# Patient Record
Sex: FEMALE | Race: BLACK OR AFRICAN AMERICAN | NOT HISPANIC OR LATINO | Employment: FULL TIME | URBAN - METROPOLITAN AREA
[De-identification: names, ages, dates, MRNs, and addresses within clinical notes are randomized per-mention and may not be internally consistent; named-entity substitution may affect disease eponyms.]

---

## 2017-07-11 ENCOUNTER — TELEPHONE (OUTPATIENT)
Dept: FAMILY MEDICINE | Facility: CLINIC | Age: 32
End: 2017-07-11

## 2017-07-13 ENCOUNTER — TELEPHONE (OUTPATIENT)
Dept: FAMILY MEDICINE | Facility: CLINIC | Age: 32
End: 2017-07-13

## 2017-07-13 DIAGNOSIS — R73.03 PREDIABETES: ICD-10-CM

## 2017-07-13 NOTE — TELEPHONE ENCOUNTER
I called and spoke with patient and made her an appointment on Monday September 25th she is asking if she needs to have lab work done before her appointment.Thanks

## 2017-07-16 ENCOUNTER — PATIENT MESSAGE (OUTPATIENT)
Dept: FAMILY MEDICINE | Facility: CLINIC | Age: 32
End: 2017-07-16

## 2017-09-04 ENCOUNTER — HOSPITAL ENCOUNTER (EMERGENCY)
Facility: OTHER | Age: 32
Discharge: HOME | End: 2017-09-04
Attending: EMERGENCY MEDICINE
Payer: COMMERCIAL

## 2017-09-04 VITALS
HEIGHT: 63 IN | OXYGEN SATURATION: 99 % | HEART RATE: 88 BPM | BODY MASS INDEX: 38.98 KG/M2 | SYSTOLIC BLOOD PRESSURE: 138 MMHG | DIASTOLIC BLOOD PRESSURE: 70 MMHG | WEIGHT: 220 LBS | RESPIRATION RATE: 18 BRPM | TEMPERATURE: 98 F

## 2017-09-04 DIAGNOSIS — R19.7 DIARRHEA, UNSPECIFIED TYPE: ICD-10-CM

## 2017-09-04 DIAGNOSIS — R10.10 PAIN OF UPPER ABDOMEN: Primary | ICD-10-CM

## 2017-09-04 DIAGNOSIS — K80.20 CALCULUS OF GALLBLADDER WITHOUT CHOLECYSTITIS WITHOUT OBSTRUCTION: ICD-10-CM

## 2017-09-04 DIAGNOSIS — R11.0 NAUSEA: ICD-10-CM

## 2017-09-04 LAB
ALBUMIN SERPL-MCNC: 3.3 G/DL (ref 3.3–5.5)
ALP SERPL-CCNC: 72 U/L (ref 42–141)
B-HCG UR QL: NEGATIVE
BILIRUB SERPL-MCNC: 0.6 MG/DL (ref 0.2–1.6)
BILIRUBIN, POC UA: NEGATIVE
BLOOD, POC UA: ABNORMAL
BUN SERPL-MCNC: 12 MG/DL (ref 7–22)
CALCIUM SERPL-MCNC: 9.1 MG/DL (ref 8–10.3)
CHLORIDE SERPL-SCNC: 98 MMOL/L (ref 98–108)
CLARITY, POC UA: ABNORMAL
COLOR, POC UA: YELLOW
CREAT SERPL-MCNC: 0.8 MG/DL (ref 0.6–1.2)
CTP QC/QA: YES
GLUCOSE SERPL-MCNC: 151 MG/DL (ref 73–118)
GLUCOSE, POC UA: NEGATIVE
KETONES, POC UA: NEGATIVE
LEUKOCYTE EST, POC UA: NEGATIVE
NITRITE, POC UA: NEGATIVE
PH UR STRIP: 7 [PH]
POC ALT (SGPT): 121 U/L (ref 10–47)
POC AST (SGOT): 123 U/L (ref 11–38)
POC TCO2: 26 MMOL/L (ref 18–33)
POTASSIUM BLD-SCNC: 3.4 MMOL/L (ref 3.6–5.1)
PROTEIN, POC UA: ABNORMAL
PROTEIN, POC: 7.8 G/DL (ref 6.4–8.1)
SODIUM BLD-SCNC: 134 MMOL/L (ref 128–145)
SPECIFIC GRAVITY, POC UA: 1.02
UROBILINOGEN, POC UA: 0.2 E.U./DL

## 2017-09-04 PROCEDURE — 25000003 PHARM REV CODE 250: Performed by: EMERGENCY MEDICINE

## 2017-09-04 PROCEDURE — 99284 EMERGENCY DEPT VISIT MOD MDM: CPT | Mod: 25

## 2017-09-04 PROCEDURE — 80053 COMPREHEN METABOLIC PANEL: CPT

## 2017-09-04 PROCEDURE — 96375 TX/PRO/DX INJ NEW DRUG ADDON: CPT

## 2017-09-04 PROCEDURE — 63600175 PHARM REV CODE 636 W HCPCS: Performed by: EMERGENCY MEDICINE

## 2017-09-04 PROCEDURE — 81025 URINE PREGNANCY TEST: CPT | Performed by: EMERGENCY MEDICINE

## 2017-09-04 PROCEDURE — 81003 URINALYSIS AUTO W/O SCOPE: CPT

## 2017-09-04 PROCEDURE — 96374 THER/PROPH/DIAG INJ IV PUSH: CPT

## 2017-09-04 PROCEDURE — 85025 COMPLETE CBC W/AUTO DIFF WBC: CPT

## 2017-09-04 PROCEDURE — 96361 HYDRATE IV INFUSION ADD-ON: CPT

## 2017-09-04 RX ORDER — KETOROLAC TROMETHAMINE 30 MG/ML
15 INJECTION, SOLUTION INTRAMUSCULAR; INTRAVENOUS
Status: COMPLETED | OUTPATIENT
Start: 2017-09-04 | End: 2017-09-04

## 2017-09-04 RX ORDER — SODIUM CHLORIDE 9 MG/ML
1000 INJECTION, SOLUTION INTRAVENOUS
Status: COMPLETED | OUTPATIENT
Start: 2017-09-04 | End: 2017-09-04

## 2017-09-04 RX ORDER — ONDANSETRON 4 MG/1
4 TABLET, FILM COATED ORAL EVERY 6 HOURS PRN
Qty: 15 TABLET | Refills: 0 | Status: SHIPPED | OUTPATIENT
Start: 2017-09-04 | End: 2017-09-25

## 2017-09-04 RX ORDER — TRAMADOL HYDROCHLORIDE 50 MG/1
50 TABLET ORAL EVERY 8 HOURS PRN
Qty: 12 TABLET | Refills: 0 | Status: SHIPPED | OUTPATIENT
Start: 2017-09-04 | End: 2017-09-14

## 2017-09-04 RX ORDER — ONDANSETRON 2 MG/ML
4 INJECTION INTRAMUSCULAR; INTRAVENOUS
Status: COMPLETED | OUTPATIENT
Start: 2017-09-04 | End: 2017-09-04

## 2017-09-04 RX ADMIN — KETOROLAC TROMETHAMINE 15 MG: 30 INJECTION, SOLUTION INTRAMUSCULAR at 12:09

## 2017-09-04 RX ADMIN — SODIUM CHLORIDE 1000 ML: 0.9 INJECTION, SOLUTION INTRAVENOUS at 07:09

## 2017-09-04 RX ADMIN — ONDANSETRON 4 MG: 2 INJECTION INTRAMUSCULAR; INTRAVENOUS at 07:09

## 2017-09-04 NOTE — ED PROVIDER NOTES
Encounter Date: 2017       History     Chief Complaint   Patient presents with    Abdominal Pain     upper/mis abd pain constant since midnight, + nausea, no vomitng, soft stool approx 1 hour ago; denies any  complaints     Ms Gandhi reports onset of nausea, epigastric pain and diarrhea around midnight. Reports diarrhea, non bloody, watery, 'several times'. No vomiting. States abd pain has migrated to include mid abdomen and diffuse. No hx of same, no recent sick contacts, no fevers. No cp or sob. No meds tried at home. sxs are constant.       The history is provided by the patient.     Review of patient's allergies indicates:  No Known Allergies  Past Medical History:   Diagnosis Date    Alpha thalassemia trait     Anemia     Menorrhagia     Obesity     Prediabetes      Past Surgical History:   Procedure Laterality Date     SECTION, LOW TRANSVERSE      x1    tonsillectomy       Family History   Problem Relation Age of Onset    Hypertension Mother     Diabetes Mother     Heart disease Father      enlarged    Diabetes Maternal Aunt     Diabetes Maternal Uncle     Colon cancer Maternal Uncle     Breast cancer Neg Hx      Social History   Substance Use Topics    Smoking status: Never Smoker    Smokeless tobacco: Never Used    Alcohol use Not on file     Review of Systems   Respiratory: Negative.    Cardiovascular: Negative.    Gastrointestinal: Positive for abdominal pain, diarrhea, nausea and vomiting.   All other systems reviewed and are negative.      Physical Exam     Initial Vitals [17 0656]   BP Pulse Resp Temp SpO2   135/83 88 18 98.7 °F (37.1 °C) 100 %      MAP       100.33         Physical Exam    Nursing note and vitals reviewed.  Constitutional: She appears well-developed and well-nourished. She is not diaphoretic.   Sitting up. Calm, still. Appears to not feel well.    HENT:   Head: Normocephalic and atraumatic.   Neck: Normal range of motion. Neck supple.    Cardiovascular: Normal rate, regular rhythm and normal heart sounds.   No murmur heard.  Pulmonary/Chest: Breath sounds normal. No respiratory distress. She has no wheezes. She has no rales.   Abdominal: Soft. She exhibits no distension. There is tenderness. There is no rebound and no guarding.   Diffuse upper and mid abd ttp.    Musculoskeletal: Normal range of motion. She exhibits no edema or tenderness.   Neurological: She is alert and oriented to person, place, and time.   Skin: Skin is warm and dry. Capillary refill takes less than 2 seconds. No rash noted. No erythema.   Psychiatric: She has a normal mood and affect. Her behavior is normal. Thought content normal.         ED Course   Procedures  Labs Reviewed   POCT URINALYSIS W/O SCOPE - Abnormal; Notable for the following:        Result Value    Glucose, UA Negative (*)     Bilirubin, UA Negative (*)     Ketones, UA Negative (*)     Blood, UA Trace-lysed (*)     Protein, UA Trace (*)     Nitrite, UA Negative (*)     Leukocytes, UA Negative (*)     All other components within normal limits   POCT URINALYSIS W/O SCOPE   POCT URINE PREGNANCY   POCT CBC   POCT CMP             Medical Decision Making:   Clinical Tests:   Lab Tests: Ordered and Reviewed  Radiological Study: Ordered and Reviewed  ED Management:  Ms Gandhi feels better and wants to go home. We discussed US and lab results extensively. We discussed options of admission vs outpt f/u and she wants to f/u as outpt for further care. We discussed worrisome signs that should prompt need to return to the er should they occur. There is no indication for further emergent intervention or evaluation at this time.           Imaging Results          US Abdomen Limited (Final result)  Result time 09/04/17 11:06:00    Final result by Amanda Sinclair MD (09/04/17 11:06:00)                 Impression:     Hepatomegaly with fatty infiltration of the liver.    Echogenic material within the gallbladder without  posterior shadowing may reflect sludge versus small stones.  No finding to indicate acute cholecystitis.      Electronically signed by: Amanda Sinclair MD  Date:     09/04/17  Time:    11:05              Narrative:    The visualized pancreas is unremarkable.  The aorta is not aneurysmal.  The gallbladder demonstrates echogenic material along the posterior wall without shadowing.  This may represent sludge versus small stones.  There is no tenderness while scanning over the gallbladder.  No gallbladder wall thickening or pericholecystic fluid is present.  The common duct is 3 mm, not dilated.    The liver is enlarged, 17.8 cm, and demonstrates fatty infiltration.  The right kidney is unremarkable.                                  Admission on 09/04/2017, Discharged on 09/04/2017   Component Date Value Ref Range Status    POC Preg Test, Ur 09/04/2017 Negative  Negative Final     Acceptable 09/04/2017 Yes   Final    Glucose, UA 09/04/2017 Negative*  Final    Bilirubin, UA 09/04/2017 Negative*  Final    Ketones, UA 09/04/2017 Negative*  Final    Spec Grav UA 09/04/2017 1.025   Final    Blood, UA 09/04/2017 Trace-lysed*  Final    PH, UA 09/04/2017 7.0   Final    Protein, UA 09/04/2017 Trace*  Final    Urobilinogen, UA 09/04/2017 0.2  E.U./dL Final    Nitrite, UA 09/04/2017 Negative*  Final    Leukocytes, UA 09/04/2017 Negative*  Final    Color, UA 09/04/2017 Yellow   Final    Clarity, UA 09/04/2017 Slightly Cloudy   Final    Albumin, POC 09/04/2017 3.3* 3.3 - 5.5 g/dL Final    Alkaline Phosphatase, POC 09/04/2017 72  42 - 141 U/L Final    ALT (SGPT), POC 09/04/2017 121* 10 - 47 U/L Final    AST (SGOT), POC 09/04/2017 123* 11 - 38 U/L Final    POC BUN 09/04/2017 12  7 - 22 mg/dL Final    Calcium, POC 09/04/2017 9.1  8.0 - 10.3 mg/dL Final    POC Chloride 09/04/2017 98* 98 - 108 mmol/L Final    POC Creatinine 09/04/2017 0.8  0.6 - 1.2 mg/dL Final    POC Glucose 09/04/2017 151* 73 - 118  mg/dL Final    POC Potassium 09/04/2017 3.4* 3.6 - 5.1 mmol/L Final    POC Sodium 09/04/2017 134  128 - 145 mmol/L Final    Bilirubin 09/04/2017 0.6  0.2 - 1.6 mg/dL Final    POC TCO2 09/04/2017 26  18 - 33 mmol/L Final    Protein 09/04/2017 7.8  6.4 - 8.1 g/dL Final                ED Course      Clinical Impression:   The primary encounter diagnosis was Pain of upper abdomen. Diagnoses of Calculus of gallbladder without cholecystitis without obstruction, Diarrhea, unspecified type, and Nausea were also pertinent to this visit.                           Norberto Worley MD  09/06/17 5072

## 2017-09-04 NOTE — DISCHARGE INSTRUCTIONS
Rest. Drink plenty of fluids. Follow a bland diet. Return for any new or acute problems or concerns.

## 2017-09-16 ENCOUNTER — LAB VISIT (OUTPATIENT)
Dept: LAB | Facility: HOSPITAL | Age: 32
End: 2017-09-16
Attending: INTERNAL MEDICINE
Payer: COMMERCIAL

## 2017-09-16 DIAGNOSIS — R73.03 PREDIABETES: ICD-10-CM

## 2017-09-16 LAB
ALBUMIN SERPL BCP-MCNC: 3.4 G/DL
ALP SERPL-CCNC: 73 U/L
ALT SERPL W/O P-5'-P-CCNC: 130 U/L
ANION GAP SERPL CALC-SCNC: 9 MMOL/L
ANISOCYTOSIS BLD QL SMEAR: SLIGHT
AST SERPL-CCNC: 74 U/L
BASOPHILS # BLD AUTO: 0.01 K/UL
BASOPHILS NFR BLD: 0.3 %
BILIRUB SERPL-MCNC: 0.4 MG/DL
BUN SERPL-MCNC: 12 MG/DL
CALCIUM SERPL-MCNC: 8.9 MG/DL
CHLORIDE SERPL-SCNC: 105 MMOL/L
CHOLEST SERPL-MCNC: 168 MG/DL
CHOLEST/HDLC SERPL: 4.3 {RATIO}
CO2 SERPL-SCNC: 24 MMOL/L
CREAT SERPL-MCNC: 0.9 MG/DL
DIFFERENTIAL METHOD: ABNORMAL
EOSINOPHIL # BLD AUTO: 0.1 K/UL
EOSINOPHIL NFR BLD: 2.3 %
ERYTHROCYTE [DISTWIDTH] IN BLOOD BY AUTOMATED COUNT: 16.4 %
EST. GFR  (AFRICAN AMERICAN): >60 ML/MIN/1.73 M^2
EST. GFR  (NON AFRICAN AMERICAN): >60 ML/MIN/1.73 M^2
ESTIMATED AVG GLUCOSE: 111 MG/DL
GIANT PLATELETS BLD QL SMEAR: PRESENT
GLUCOSE SERPL-MCNC: 87 MG/DL
HBA1C MFR BLD HPLC: 5.5 %
HCT VFR BLD AUTO: 36.1 %
HDLC SERPL-MCNC: 39 MG/DL
HDLC SERPL: 23.2 %
HGB BLD-MCNC: 11.1 G/DL
LDLC SERPL CALC-MCNC: 116.6 MG/DL
LYMPHOCYTES # BLD AUTO: 2.1 K/UL
LYMPHOCYTES NFR BLD: 52.4 %
MCH RBC QN AUTO: 22.4 PG
MCHC RBC AUTO-ENTMCNC: 30.7 G/DL
MCV RBC AUTO: 73 FL
MONOCYTES # BLD AUTO: 0.5 K/UL
MONOCYTES NFR BLD: 13 %
NEUTROPHILS # BLD AUTO: 1.3 K/UL
NEUTROPHILS NFR BLD: 32 %
NONHDLC SERPL-MCNC: 129 MG/DL
OVALOCYTES BLD QL SMEAR: ABNORMAL
PLATELET # BLD AUTO: 213 K/UL
PLATELET BLD QL SMEAR: ABNORMAL
PMV BLD AUTO: ABNORMAL FL
POIKILOCYTOSIS BLD QL SMEAR: SLIGHT
POTASSIUM SERPL-SCNC: 4.2 MMOL/L
PROT SERPL-MCNC: 7.9 G/DL
RBC # BLD AUTO: 4.96 M/UL
SODIUM SERPL-SCNC: 138 MMOL/L
TRIGL SERPL-MCNC: 62 MG/DL
WBC # BLD AUTO: 3.93 K/UL

## 2017-09-16 PROCEDURE — 36415 COLL VENOUS BLD VENIPUNCTURE: CPT | Mod: PO

## 2017-09-16 PROCEDURE — 85025 COMPLETE CBC W/AUTO DIFF WBC: CPT

## 2017-09-16 PROCEDURE — 80061 LIPID PANEL: CPT

## 2017-09-16 PROCEDURE — 80053 COMPREHEN METABOLIC PANEL: CPT

## 2017-09-16 PROCEDURE — 83036 HEMOGLOBIN GLYCOSYLATED A1C: CPT

## 2017-09-25 ENCOUNTER — OFFICE VISIT (OUTPATIENT)
Dept: FAMILY MEDICINE | Facility: CLINIC | Age: 32
End: 2017-09-25
Payer: COMMERCIAL

## 2017-09-25 VITALS
WEIGHT: 220.56 LBS | HEIGHT: 63 IN | DIASTOLIC BLOOD PRESSURE: 80 MMHG | OXYGEN SATURATION: 98 % | TEMPERATURE: 98 F | HEART RATE: 70 BPM | BODY MASS INDEX: 39.08 KG/M2 | SYSTOLIC BLOOD PRESSURE: 104 MMHG

## 2017-09-25 DIAGNOSIS — K76.0 FATTY LIVER: ICD-10-CM

## 2017-09-25 DIAGNOSIS — Z23 FLU VACCINE NEED: ICD-10-CM

## 2017-09-25 DIAGNOSIS — Z00.00 ROUTINE MEDICAL EXAM: Primary | ICD-10-CM

## 2017-09-25 DIAGNOSIS — R73.03 PREDIABETES: ICD-10-CM

## 2017-09-25 DIAGNOSIS — Z12.4 SCREENING FOR CERVICAL CANCER: ICD-10-CM

## 2017-09-25 DIAGNOSIS — Z23 NEED FOR STREPTOCOCCUS PNEUMONIAE VACCINATION: ICD-10-CM

## 2017-09-25 DIAGNOSIS — Z01.419 ENCOUNTER FOR GYNECOLOGICAL EXAMINATION WITHOUT ABNORMAL FINDING: ICD-10-CM

## 2017-09-25 DIAGNOSIS — D50.9 IRON DEFICIENCY ANEMIA, UNSPECIFIED IRON DEFICIENCY ANEMIA TYPE: ICD-10-CM

## 2017-09-25 DIAGNOSIS — D56.3 ALPHA THALASSEMIA TRAIT: ICD-10-CM

## 2017-09-25 DIAGNOSIS — K80.20 CALCULUS OF GALLBLADDER WITHOUT CHOLECYSTITIS WITHOUT OBSTRUCTION: ICD-10-CM

## 2017-09-25 PROCEDURE — 90686 IIV4 VACC NO PRSV 0.5 ML IM: CPT | Mod: S$GLB,,, | Performed by: INTERNAL MEDICINE

## 2017-09-25 PROCEDURE — 90670 PCV13 VACCINE IM: CPT | Mod: S$GLB,,, | Performed by: INTERNAL MEDICINE

## 2017-09-25 PROCEDURE — 88175 CYTOPATH C/V AUTO FLUID REDO: CPT

## 2017-09-25 PROCEDURE — 99395 PREV VISIT EST AGE 18-39: CPT | Mod: 25,S$GLB,, | Performed by: INTERNAL MEDICINE

## 2017-09-25 PROCEDURE — 99999 PR PBB SHADOW E&M-EST. PATIENT-LVL IV: CPT | Mod: PBBFAC,,, | Performed by: INTERNAL MEDICINE

## 2017-09-25 PROCEDURE — 90471 IMMUNIZATION ADMIN: CPT | Mod: S$GLB,,, | Performed by: INTERNAL MEDICINE

## 2017-09-25 PROCEDURE — 87624 HPV HI-RISK TYP POOLED RSLT: CPT

## 2017-09-25 PROCEDURE — 90472 IMMUNIZATION ADMIN EACH ADD: CPT | Mod: S$GLB,,, | Performed by: INTERNAL MEDICINE

## 2017-09-25 NOTE — PROGRESS NOTES
HISTORY OF PRESENT ILLNESS:  Dustin Gandhi is a 32 y.o. female who presents to the clinic today for a routine physical exam. Her last physical exam was approximately 1 years(s) ago.    Contraception: no method      PAST MEDICAL HISTORY:  Past Medical History:   Diagnosis Date    Alpha thalassemia trait     Anemia     Cholelithiasis     Fatty liver     Menorrhagia     Obesity     Prediabetes        PAST SURGICAL HISTORY:  Past Surgical History:   Procedure Laterality Date     SECTION, LOW TRANSVERSE      x1    tonsillectomy         SOCIAL HISTORY:  Social History     Social History    Marital status: Single     Spouse name: N/A    Number of children: 1    Years of education: N/A     Occupational History    lab worker      Social History Main Topics    Smoking status: Never Smoker    Smokeless tobacco: Never Used    Alcohol use Yes      Comment: occasionally     Drug use: No    Sexual activity: Yes     Partners: Male     Birth control/ protection: Condom     Other Topics Concern    Not on file     Social History Narrative    No narrative on file       FAMILY HISTORY:  Family History   Problem Relation Age of Onset    Hypertension Mother     Diabetes Mother     Heart disease Father      enlarged    Diabetes Maternal Aunt     Diabetes Maternal Uncle     Colon cancer Maternal Uncle     Breast cancer Neg Hx        ALLERGIES AND MEDICATIONS: updated and reviewed.  Review of patient's allergies indicates:  No Known Allergies  Medication List with Changes/Refills   Discontinued Medications    ONDANSETRON (ZOFRAN) 4 MG TABLET    Take 1 tablet (4 mg total) by mouth every 6 (six) hours as needed for Nausea.             SCREENING HISTORY:  Health Maintenance       Date Due Completion Date    Pneumococcal PCV13 (High Risk) (1 - PCV13 Required) 1984 ---    Pneumococcal PPSV23 (High Risk) (1) 2001 ---    Pap Smear with HPV Cotest 2016    Override on 2012: Done     "Override on 12/10/2008: Done    Influenza Vaccine 08/01/2017 ---    TETANUS VACCINE 01/06/2022 1/6/2012            REVIEW OF SYSTEMS:   The patient reports good dietary habits.  The patient does exercise regularly.  Review of Systems   Constitutional: Negative for chills, fatigue and fever.   HENT: Negative.    Eyes: Negative.    Respiratory: Negative.    Cardiovascular: Negative.    Gastrointestinal: Negative for blood in stool, constipation, diarrhea, nausea and vomiting.   Endocrine: Negative.    Genitourinary: Negative for dysuria, frequency, hematuria and vaginal discharge.   Musculoskeletal: Negative.    Skin:  Negative.   Neurological: Negative.    Hematological: Negative.    Psychiatric/Behavioral: Negative.    Breast: negative.       She recently went to the ER for abdominal pain. Diagnosed with gallstones and fatty liver. Made dietary changes and abdominal pain resolved.      Physical Examination:   Vitals:    09/25/17 1422   BP: 104/80   Pulse: 70   Temp: 97.8 °F (36.6 °C)     Weight: 100 kg (220 lb 9.1 oz)   Height: 5' 2.99" (160 cm)   Body mass index is 39.08 kg/m².    General appearance - alert, well appearing, and in no distress and obese  Mental status - alert, oriented to person, place, and time, normal mood, behavior, speech, dress, motor activity, and thought processes  Eyes - pupils equal and reactive, extraocular eye movements intact, sclera anicteric  Mouth - mucous membranes moist, pharynx normal without lesions  Neck - supple, no significant adenopathy, carotids upstroke normal bilaterally, no bruits, thyroid exam: thyroid is normal in size without nodules or tenderness  Lymphatics - no palpable lymphadenopathy  Chest - clear to auscultation, no wheezes, rales or rhonchi, symmetric air entry  Heart - normal rate and regular rhythm, no gallops noted  Abdomen - soft, nontender, nondistended, no masses or organomegaly  Breasts - not examined  Pelvic - normal external genitalia, vulva, vagina, " cervix, uterus and adnexa  Back exam - full range of motion, no tenderness, palpable spasm or pain on motion  Neurological - alert, oriented, normal speech, no focal findings or movement disorder noted, cranial nerves II through XII intact  Musculoskeletal - no joint tenderness, deformity or swelling, no muscular tenderness noted  Extremities - peripheral pulses normal, no pedal edema, no clubbing or cyanosis  Skin - normal coloration and turgor, no rashes, no suspicious skin lesions noted      Results for orders placed or performed in visit on 09/16/17   CBC auto differential   Result Value Ref Range    WBC 3.93 3.90 - 12.70 K/uL    RBC 4.96 4.00 - 5.40 M/uL    Hemoglobin 11.1 (L) 12.0 - 16.0 g/dL    Hematocrit 36.1 (L) 37.0 - 48.5 %    MCV 73 (L) 82 - 98 fL    MCH 22.4 (L) 27.0 - 31.0 pg    MCHC 30.7 (L) 32.0 - 36.0 g/dL    RDW 16.4 (H) 11.5 - 14.5 %    Platelets 213 150 - 350 K/uL    MPV SEE COMMENT 9.2 - 12.9 fL    Gran # 1.3 (L) 1.8 - 7.7 K/uL    Lymph # 2.1 1.0 - 4.8 K/uL    Mono # 0.5 0.3 - 1.0 K/uL    Eos # 0.1 0.0 - 0.5 K/uL    Baso # 0.01 0.00 - 0.20 K/uL    Gran% 32.0 (L) 38.0 - 73.0 %    Lymph% 52.4 (H) 18.0 - 48.0 %    Mono% 13.0 4.0 - 15.0 %    Eosinophil% 2.3 0.0 - 8.0 %    Basophil% 0.3 0.0 - 1.9 %    Platelet Estimate Appears normal     Aniso Slight     Poik Slight     Ovalocytes Occasional     Large/Giant Platelets Present     Differential Method Automated    Comprehensive metabolic panel   Result Value Ref Range    Sodium 138 136 - 145 mmol/L    Potassium 4.2 3.5 - 5.1 mmol/L    Chloride 105 95 - 110 mmol/L    CO2 24 23 - 29 mmol/L    Glucose 87 70 - 110 mg/dL    BUN, Bld 12 6 - 20 mg/dL    Creatinine 0.9 0.5 - 1.4 mg/dL    Calcium 8.9 8.7 - 10.5 mg/dL    Total Protein 7.9 6.0 - 8.4 g/dL    Albumin 3.4 (L) 3.5 - 5.2 g/dL    Total Bilirubin 0.4 0.1 - 1.0 mg/dL    Alkaline Phosphatase 73 55 - 135 U/L    AST 74 (H) 10 - 40 U/L     (H) 10 - 44 U/L    Anion Gap 9 8 - 16 mmol/L    eGFR if African  American >60.0 >60 mL/min/1.73 m^2    eGFR if non African American >60.0 >60 mL/min/1.73 m^2   Lipid panel   Result Value Ref Range    Cholesterol 168 120 - 199 mg/dL    Triglycerides 62 30 - 150 mg/dL    HDL 39 (L) 40 - 75 mg/dL    LDL Cholesterol 116.6 63.0 - 159.0 mg/dL    HDL/Chol Ratio 23.2 20.0 - 50.0 %    Total Cholesterol/HDL Ratio 4.3 2.0 - 5.0    Non-HDL Cholesterol 129 mg/dL   Hemoglobin A1c   Result Value Ref Range    Hemoglobin A1C 5.5 4.0 - 5.6 %    Estimated Avg Glucose 111 68 - 131 mg/dL          ASSESSMENT AND PLAN:  1. Routine medical exam  Counseled on age appropriate medical preventative services including age appropriate cancer screenings, age appropriate eye and dental exams, over all nutritional health, need for a consistent exercise regimen, and an over all push towards maintaining a vigorous and active lifestyle.  Counseled on age appropriate vaccines and discussed upcoming health care needs based on age/gender. Discussed good sleep hygiene and stress management.     2. Prediabetes  Stable. We discussed low sugar/low carbohydrate diet and regular exercise to prevent progression. No need for prescription medication at this time.     3. Alpha thalassemia trait  Stable. Asymptomatic. Observe.     4. Fatty liver/elevated LFTs  Asymptomatic. We discussed the need for weight loss to prevent progression to cirrhosis of the liver. The patient had a NAFLD fibrosis score of 1.9.  She recently made dietary changes and is exercising.  I will recheck LFTs in about 1 month to make sure that they have normalized.  Consider hepatology consultation if they have not.  Otherwise, recheck ultrasound in one year.     5. Calculus of gallbladder without cholecystitis without obstruction  Patient asymptomatic with dietary changes.  No need for further evaluation or treatment unless symptoms recur.    6. Iron deficiency anemia, unspecified iron deficiency anemia type  Mild.  She has alpha thalassemia which probably  explains most of her microcytosis.  I did ask her to restart iron supplementation.  I will recheck blood work in one month.    7. Obesity, Class II, BMI 35-39.9, with comorbidity  The patient is asked to make an attempt to improve diet and exercise patterns to aid in medical management of this problem.     8. Encounter for gynecological examination without abnormal finding/9. Screening for cervical cancer    - Liquid-based pap smear, screening  - HPV High Risk Genotypes, PCR    10. Flu vaccine need    - Influenza - Quadrivalent (3 years & older) (PF)    11. Need for Streptococcus pneumoniae vaccination    - (In Office Administered) Pneumococcal Conjugate Vaccine (13 Valent) (IM)          Return in about 1 year (around 9/25/2018), or if symptoms worsen or fail to improve, for annual exam. or sooner as needed.

## 2017-09-25 NOTE — PROGRESS NOTES
Influenza and Prevnar-13 vaccine administered, quirino well. Instructed to wait 15mins for observation, no reaction noted @ time of discharge.

## 2017-10-02 DIAGNOSIS — B96.89 BV (BACTERIAL VAGINOSIS): Primary | ICD-10-CM

## 2017-10-02 DIAGNOSIS — N76.0 BV (BACTERIAL VAGINOSIS): Primary | ICD-10-CM

## 2017-10-02 LAB
HPV HR 12 DNA CVX QL NAA+PROBE: NEGATIVE
HPV16 DNA SPEC QL NAA+PROBE: NEGATIVE
HPV18 DNA SPEC QL NAA+PROBE: NEGATIVE

## 2017-10-02 RX ORDER — METRONIDAZOLE 500 MG/1
500 TABLET ORAL EVERY 12 HOURS
Qty: 14 TABLET | Refills: 0 | Status: SHIPPED | OUTPATIENT
Start: 2017-10-02 | End: 2017-10-09

## 2017-10-23 ENCOUNTER — TELEPHONE (OUTPATIENT)
Dept: FAMILY MEDICINE | Facility: CLINIC | Age: 32
End: 2017-10-23

## 2017-10-23 DIAGNOSIS — R79.89 ELEVATED LFTS: Primary | ICD-10-CM

## 2017-10-23 DIAGNOSIS — D50.9 IRON DEFICIENCY ANEMIA, UNSPECIFIED IRON DEFICIENCY ANEMIA TYPE: ICD-10-CM

## 2017-10-23 NOTE — TELEPHONE ENCOUNTER
----- Message from Sharron Valdes MD sent at 9/25/2017  3:12 PM CDT -----  Recheck lfts and iron studies to follow up on elevated lfts and mild RAFIQ, respectively.

## 2017-10-28 ENCOUNTER — LAB VISIT (OUTPATIENT)
Dept: LAB | Facility: HOSPITAL | Age: 32
End: 2017-10-28
Attending: INTERNAL MEDICINE
Payer: COMMERCIAL

## 2017-10-28 DIAGNOSIS — R79.89 ELEVATED LFTS: ICD-10-CM

## 2017-10-28 DIAGNOSIS — D50.9 IRON DEFICIENCY ANEMIA, UNSPECIFIED IRON DEFICIENCY ANEMIA TYPE: ICD-10-CM

## 2017-10-28 LAB
ALBUMIN SERPL BCP-MCNC: 3.3 G/DL
ALP SERPL-CCNC: 71 U/L
ALT SERPL W/O P-5'-P-CCNC: 115 U/L
AST SERPL-CCNC: 93 U/L
BASOPHILS # BLD AUTO: 0.02 K/UL
BASOPHILS NFR BLD: 0.4 %
BILIRUB DIRECT SERPL-MCNC: 0.1 MG/DL
BILIRUB SERPL-MCNC: 0.3 MG/DL
DIFFERENTIAL METHOD: ABNORMAL
EOSINOPHIL # BLD AUTO: 0.1 K/UL
EOSINOPHIL NFR BLD: 1.7 %
ERYTHROCYTE [DISTWIDTH] IN BLOOD BY AUTOMATED COUNT: 16.1 %
FERRITIN SERPL-MCNC: 47 NG/ML
HCT VFR BLD AUTO: 37.5 %
HGB BLD-MCNC: 11.5 G/DL
IMM GRANULOCYTES # BLD AUTO: 0.01 K/UL
IMM GRANULOCYTES NFR BLD AUTO: 0.2 %
IRON SERPL-MCNC: 30 UG/DL
LYMPHOCYTES # BLD AUTO: 1.8 K/UL
LYMPHOCYTES NFR BLD: 38.1 %
MCH RBC QN AUTO: 22.6 PG
MCHC RBC AUTO-ENTMCNC: 30.7 G/DL
MCV RBC AUTO: 74 FL
MONOCYTES # BLD AUTO: 0.7 K/UL
MONOCYTES NFR BLD: 14 %
NEUTROPHILS # BLD AUTO: 2.2 K/UL
NEUTROPHILS NFR BLD: 45.6 %
NRBC BLD-RTO: 0 /100 WBC
PLATELET # BLD AUTO: 181 K/UL
PMV BLD AUTO: ABNORMAL FL
PROT SERPL-MCNC: 8.2 G/DL
RBC # BLD AUTO: 5.08 M/UL
SATURATED IRON: 8 %
TOTAL IRON BINDING CAPACITY: 367 UG/DL
TRANSFERRIN SERPL-MCNC: 248 MG/DL
WBC # BLD AUTO: 4.72 K/UL

## 2017-10-28 PROCEDURE — 36415 COLL VENOUS BLD VENIPUNCTURE: CPT | Mod: PO

## 2017-10-28 PROCEDURE — 83540 ASSAY OF IRON: CPT

## 2017-10-28 PROCEDURE — 85025 COMPLETE CBC W/AUTO DIFF WBC: CPT

## 2017-10-28 PROCEDURE — 80076 HEPATIC FUNCTION PANEL: CPT

## 2017-10-28 PROCEDURE — 82728 ASSAY OF FERRITIN: CPT

## 2017-10-30 ENCOUNTER — PATIENT MESSAGE (OUTPATIENT)
Dept: FAMILY MEDICINE | Facility: CLINIC | Age: 32
End: 2017-10-30

## 2017-10-30 DIAGNOSIS — K76.0 FATTY LIVER: Primary | ICD-10-CM

## 2017-11-01 ENCOUNTER — DOCUMENTATION ONLY (OUTPATIENT)
Dept: TRANSPLANT | Facility: CLINIC | Age: 32
End: 2017-11-01

## 2017-11-01 NOTE — LETTER
November 1, 2017    Dustin Gandhi  [address] [place] [id number]      Dear Dustin Gandhi:    Your doctor has referred you to the [facility]. You will be contacted by our office and an initial appointment will then be scheduled for you.    We look forward to seeing you soon. If you have any further questions, please contact us at [phone].       Sincerely,        [facility]   [address] [place] [id number]  [phone]

## 2017-11-02 ENCOUNTER — TELEPHONE (OUTPATIENT)
Dept: HEPATOLOGY | Facility: CLINIC | Age: 32
End: 2017-11-02

## 2017-11-02 NOTE — TELEPHONE ENCOUNTER
MA called patient schedule her Initial visit to see liver specialist patient accepted 12/1/17 to see Dr. Boggs. Mailed appt reminder to patient.CHARLEY

## 2017-11-02 NOTE — TELEPHONE ENCOUNTER
----- Message from Koki Garsia sent at 11/1/2017  9:09 PM CDT -----  Pt records reviewed.   Pt will be referred to Hepatology.    Initial referral received  from the workque.   Referring Provider/diagnosis  ANNEMARIE CEVALLOS Provider:  Diagnosis: Fatty liver      Referral letter sent to provider and patient.

## 2017-11-02 NOTE — NURSING
Pt records reviewed.   Pt will be referred to Hepatology.    Initial referral received  from the workque.   Referring Provider/diagnosis  ANNEMARIE CEVALLOS Provider:   Diagnosis: Fatty liver       Referral letter sent to provider and patient.

## 2017-12-01 ENCOUNTER — TELEPHONE (OUTPATIENT)
Dept: HEPATOLOGY | Facility: CLINIC | Age: 32
End: 2017-12-01

## 2017-12-01 PROBLEM — R79.89 ELEVATED LIVER FUNCTION TESTS: Status: ACTIVE | Noted: 2017-12-01

## 2017-12-01 NOTE — TELEPHONE ENCOUNTER
Patient seen in clinic today 12/1/17 with Dr Boggs. The patient will need an U/S Guided Liver Biopsy. Pre and Post Procedure teaching done with the patient. Written instructions given to the patient.  Patient will complete pre procedure testing and the request form will be faxed to Radiology for the requested date of 12/20/17 early am. Pt voiced understanding and agreed.

## 2017-12-02 ENCOUNTER — HOSPITAL ENCOUNTER (OUTPATIENT)
Dept: RADIOLOGY | Facility: HOSPITAL | Age: 32
Discharge: HOME | End: 2017-12-02
Attending: INTERNAL MEDICINE
Payer: COMMERCIAL

## 2017-12-02 DIAGNOSIS — K76.0 FATTY LIVER: ICD-10-CM

## 2017-12-02 PROCEDURE — 76705 ECHO EXAM OF ABDOMEN: CPT | Mod: 26,,, | Performed by: RADIOLOGY

## 2017-12-02 PROCEDURE — 76705 ECHO EXAM OF ABDOMEN: CPT | Mod: TC

## 2017-12-05 ENCOUNTER — TELEPHONE (OUTPATIENT)
Dept: HEPATOLOGY | Facility: CLINIC | Age: 32
End: 2017-12-05

## 2017-12-11 ENCOUNTER — PATIENT MESSAGE (OUTPATIENT)
Dept: HEPATOLOGY | Facility: CLINIC | Age: 32
End: 2017-12-11

## 2017-12-11 ENCOUNTER — TELEPHONE (OUTPATIENT)
Dept: HEPATOLOGY | Facility: CLINIC | Age: 32
End: 2017-12-11

## 2017-12-11 DIAGNOSIS — K76.0 FATTY LIVER: Primary | ICD-10-CM

## 2017-12-11 NOTE — TELEPHONE ENCOUNTER
Received call from Radiology with approval of the tentative date requested  for the U/S Guided Liver Biopsy. Date approved Tuesday 12/20/17 with Dosc (check in time) 7 am and Procedure at 8 am.    Patient called at home. Date and time is acceptable. Pre Procedure teaching reinforced. Verbalized understanding. The patient has a Follow-up to return to the clinic on 1/9/17 at 4 pm.  Appt letters placed in the mail.

## 2017-12-20 ENCOUNTER — SURGERY (OUTPATIENT)
Age: 32
End: 2017-12-20

## 2017-12-20 ENCOUNTER — HOSPITAL ENCOUNTER (OUTPATIENT)
Facility: HOSPITAL | Age: 32
Discharge: HOME | End: 2017-12-20
Attending: INTERNAL MEDICINE | Admitting: RADIOLOGY
Payer: COMMERCIAL

## 2017-12-20 VITALS
RESPIRATION RATE: 16 BRPM | TEMPERATURE: 98 F | BODY MASS INDEX: 35.61 KG/M2 | HEIGHT: 63 IN | WEIGHT: 201 LBS | OXYGEN SATURATION: 100 % | HEART RATE: 71 BPM | SYSTOLIC BLOOD PRESSURE: 101 MMHG | DIASTOLIC BLOOD PRESSURE: 74 MMHG

## 2017-12-20 DIAGNOSIS — K76.0 FATTY LIVER: ICD-10-CM

## 2017-12-20 LAB
B-HCG UR QL: NEGATIVE
CTP QC/QA: YES

## 2017-12-20 PROCEDURE — 63600175 PHARM REV CODE 636 W HCPCS: Performed by: RADIOLOGY

## 2017-12-20 PROCEDURE — 25000003 PHARM REV CODE 250: Performed by: RADIOLOGY

## 2017-12-20 PROCEDURE — 81025 URINE PREGNANCY TEST: CPT | Performed by: INTERNAL MEDICINE

## 2017-12-20 RX ORDER — FENTANYL CITRATE 50 UG/ML
100 INJECTION, SOLUTION INTRAMUSCULAR; INTRAVENOUS ONCE
Status: DISCONTINUED | OUTPATIENT
Start: 2017-12-20 | End: 2017-12-20 | Stop reason: HOSPADM

## 2017-12-20 RX ORDER — SODIUM CHLORIDE 9 MG/ML
500 INJECTION, SOLUTION INTRAVENOUS ONCE
Status: COMPLETED | OUTPATIENT
Start: 2017-12-20 | End: 2017-12-20

## 2017-12-20 RX ORDER — MIDAZOLAM HYDROCHLORIDE 1 MG/ML
2 INJECTION INTRAMUSCULAR; INTRAVENOUS ONCE
Status: DISCONTINUED | OUTPATIENT
Start: 2017-12-20 | End: 2017-12-20 | Stop reason: HOSPADM

## 2017-12-20 RX ORDER — FENTANYL CITRATE 50 UG/ML
INJECTION, SOLUTION INTRAMUSCULAR; INTRAVENOUS CODE/TRAUMA/SEDATION MEDICATION
Status: COMPLETED | OUTPATIENT
Start: 2017-12-20 | End: 2017-12-20

## 2017-12-20 RX ORDER — MIDAZOLAM HYDROCHLORIDE 1 MG/ML
INJECTION INTRAMUSCULAR; INTRAVENOUS CODE/TRAUMA/SEDATION MEDICATION
Status: COMPLETED | OUTPATIENT
Start: 2017-12-20 | End: 2017-12-20

## 2017-12-20 RX ADMIN — MIDAZOLAM HYDROCHLORIDE 0.5 MG: 1 INJECTION, SOLUTION INTRAMUSCULAR; INTRAVENOUS at 08:12

## 2017-12-20 RX ADMIN — SODIUM CHLORIDE 500 ML: 900 INJECTION, SOLUTION INTRAVENOUS at 07:12

## 2017-12-20 RX ADMIN — MIDAZOLAM HYDROCHLORIDE 1 MG: 1 INJECTION, SOLUTION INTRAMUSCULAR; INTRAVENOUS at 08:12

## 2017-12-20 RX ADMIN — FENTANYL CITRATE 25 MCG: 50 INJECTION, SOLUTION INTRAMUSCULAR; INTRAVENOUS at 08:12

## 2017-12-20 NOTE — PROGRESS NOTES
Ok to discharge per MD order. Discharge instructions reviewed with patient and family. Understanding verbalized. Dressing CDI. VSS. IV discontinued with cannula intact, dressing applied. Patient refused transport. Patient to ambulate to garage with family.

## 2017-12-20 NOTE — DISCHARGE SUMMARY
Radiology Discharge Summary      Hospital Course: No complications    Admit Date: 12/20/2017  Discharge Date: 12/20/2017     Instructions Given to Patient: Yes  Diet: Resume prior diet  Activity: activity as tolerated and no driving for today    Description of Condition on Discharge: Stable  Vital Signs (Most Recent): Temp: 98.1 °F (36.7 °C) (12/20/17 0900)  Pulse: 71 (12/20/17 1240)  Resp: 16 (12/20/17 1240)  BP: 101/74 (12/20/17 1240)  SpO2: 100 % (12/20/17 1240)    Discharge Disposition: Home    Discharge Diagnosis: elevated liver enzymes     Follow-up: follow-up biopsy results with Dr. Boggs.    Mahendra Mattson MD  Resident  Department of Radiology  Pager: [phone]

## 2017-12-20 NOTE — H&P
Radiology History & Physical      SUBJECTIVE:     Chief Complaint: elevated liver enzymes    History of Present Illness:  Dustin Gandhi is a 32 y.o. female with fatty liver and elevated liver enzymes who presents for ultrasound guided random liver biopsy.  Past Medical History:   Diagnosis Date    Alpha thalassemia trait     Anemia     Cholelithiasis     Elevated liver function tests 2017    Fatty liver     Menorrhagia     Obesity     Prediabetes      Past Surgical History:   Procedure Laterality Date     SECTION, LOW TRANSVERSE      x1    tonsillectomy         Home Meds:   Prior to Admission medications    Medication Sig Start Date End Date Taking? Authorizing Provider   ferrous sulfate 324 mg (65 mg iron) TbEC Take 325 mg by mouth once daily.    Historical Provider, MD     Anticoagulants/Antiplatelets: no anticoagulation    Allergies: Review of patient's allergies indicates:  No Known Allergies  Sedation History:  no adverse reactions    Review of Systems:   Hematological: no known coagulopathies  Respiratory: no shortness of breath  Cardiovascular: no chest pain  Gastrointestinal: no abdominal pain  Genito-Urinary: no dysuria  Musculoskeletal: negative  Neurological: no TIA or stroke symptoms         OBJECTIVE:     Vital Signs (Most Recent)  Temp: 98.6 °F (37 °C) (17)  Pulse: 71 (17)  Resp: 18 (17)  BP: 118/68 (17)  SpO2: 100 % (17)    Physical Exam:  ASA: 2  Mallampati: II    General: no acute distress  Mental Status: alert and oriented to person, place and time  HEENT: normocephalic, atraumatic  Chest: unlabored breathing  Abdomen: nondistended  Extremity: moves all extremities    Laboratory  Lab Results   Component Value Date    INR 1.1 2017       Lab Results   Component Value Date    WBC 5.02 2017    HGB 10.6 (L) 2017    HCT 35.4 (L) 2017    MCV 71 (L) 2017     2017      Lab Results    Component Value Date    GLU 84 12/01/2017     12/01/2017    K 4.0 12/01/2017     12/01/2017    CO2 25 12/01/2017    BUN 7 12/01/2017    CREATININE 0.8 12/01/2017    CALCIUM 9.1 12/01/2017    ALT 95 (H) 12/01/2017    AST 57 (H) 12/01/2017    ALBUMIN 3.3 (L) 12/01/2017    BILITOT 0.4 12/01/2017    BILIDIR 0.1 10/28/2017       ASSESSMENT/PLAN:     Sedation Plan: versed and fentanyl  Patient will undergo  ultrasound guided random liver biopsy.    Mahendra Mattson MD  Resident  Department of Radiology  Pager: [phone]

## 2017-12-20 NOTE — PROGRESS NOTES
Liver biopsy complete, pt tolerates well.  DSD applied to procedure site, c/d/i.  VSS.  Pt to [facility] for recovery.

## 2017-12-20 NOTE — PROCEDURES
.Radiology Post-Procedure Note    Pre Op Diagnosis: elevated liver enzymes  Post Op Diagnosis: Same    Procedure: Elevated liver enzymes    Procedure performed by: Laurence Castillo MD    Written Informed Consent Obtained: Yes  Specimen Removed: YES 1 core sample  Estimated Blood Loss: Minimal    Findings:   Successful ultrasound guided liver biopsy.    Patient tolerated procedure well.    Mahendra Mattson MD  Resident  Department of Radiology  Pager: [phone]

## 2017-12-20 NOTE — DISCHARGE INSTRUCTIONS
For scheduling: Call Reena at [phone]    For questions or concerns call: [facility] MON[facility] 8 AM- 5PM [phone]. Radiology resident on call [phone].    For immediate concerns that are not emergent, you may call our radiology clinic at: [phone]

## 2017-12-20 NOTE — PROGRESS NOTES
Pt arrived to ROCU bed 3 for 4 hour post US liver biopsy recovery. Report received from . Pt denies pain/discomfort. Dressing CDI. VSS. No acute events. family to bedside. See flow sheets for post procedure monitoring.

## 2018-01-02 ENCOUNTER — TELEPHONE (OUTPATIENT)
Dept: HEPATOLOGY | Facility: CLINIC | Age: 33
End: 2018-01-02

## 2018-01-02 ENCOUNTER — LAB VISIT (OUTPATIENT)
Dept: LAB | Facility: HOSPITAL | Age: 33
End: 2018-01-02
Attending: INTERNAL MEDICINE
Payer: COMMERCIAL

## 2018-01-02 DIAGNOSIS — K76.0 FATTY LIVER: ICD-10-CM

## 2018-01-02 LAB
ALBUMIN SERPL BCP-MCNC: 3.3 G/DL
ALP SERPL-CCNC: 76 U/L
ALT SERPL W/O P-5'-P-CCNC: 93 U/L
AST SERPL-CCNC: 68 U/L
BILIRUB DIRECT SERPL-MCNC: 0.2 MG/DL
BILIRUB SERPL-MCNC: 0.4 MG/DL
PROT SERPL-MCNC: 8 G/DL

## 2018-01-02 PROCEDURE — 80076 HEPATIC FUNCTION PANEL: CPT

## 2018-01-02 PROCEDURE — 36415 COLL VENOUS BLD VENIPUNCTURE: CPT | Mod: PO

## 2018-01-02 NOTE — TELEPHONE ENCOUNTER
----- Message from Denise Boggs MD sent at 1/1/2018  3:39 PM CST -----  Please ensure pt has f/u with me - please let patient know.

## 2018-01-11 ENCOUNTER — OFFICE VISIT (OUTPATIENT)
Dept: HEPATOLOGY | Facility: CLINIC | Age: 33
End: 2018-01-11
Payer: COMMERCIAL

## 2018-01-11 ENCOUNTER — PATIENT MESSAGE (OUTPATIENT)
Dept: HEPATOLOGY | Facility: CLINIC | Age: 33
End: 2018-01-11

## 2018-01-11 VITALS
OXYGEN SATURATION: 100 % | HEART RATE: 82 BPM | DIASTOLIC BLOOD PRESSURE: 69 MMHG | TEMPERATURE: 97 F | SYSTOLIC BLOOD PRESSURE: 117 MMHG | HEIGHT: 63 IN | BODY MASS INDEX: 35.82 KG/M2 | RESPIRATION RATE: 18 BRPM | WEIGHT: 202.19 LBS

## 2018-01-11 DIAGNOSIS — K76.0 FATTY LIVER: ICD-10-CM

## 2018-01-11 DIAGNOSIS — R79.89 ELEVATED LIVER FUNCTION TESTS: Primary | ICD-10-CM

## 2018-01-11 PROCEDURE — 99999 PR PBB SHADOW E&M-EST. PATIENT-LVL IV: CPT | Mod: PBBFAC,,, | Performed by: INTERNAL MEDICINE

## 2018-01-11 PROCEDURE — 99214 OFFICE O/P EST MOD 30 MIN: CPT | Mod: S$GLB,,, | Performed by: INTERNAL MEDICINE

## 2018-01-11 NOTE — PATIENT INSTRUCTIONS
1. Return 6 months  2. Will put on research list  3. Avoid alcohol  4. Vitamin E 800 units daily

## 2018-01-15 NOTE — PROGRESS NOTES
HEPATOLOGY FOLLOW UP    Referring Physician: Sharron Valdes MD  Current Corresponding Physician: Sharron Valdes MD    Dustin Gandhi is here for follow up of Fatty Liver      HPI  Since Dustin Gandhi's last visit she underwent a liver biopsy because fibroscan suggested F3 fibrosis. Liver biopsy shows fatty liver with a BARRINGTON score or 3/8 and stage 1 fibrosis. Serologic w/u neg for other etiologies.    Outpatient Encounter Prescriptions as of 1/11/2018   Medication Sig Dispense Refill    ferrous sulfate 324 mg (65 mg iron) TbEC Take 325 mg by mouth once daily.       No facility-administered encounter medications on file as of 1/11/2018.      Review of patient's allergies indicates:  No Known Allergies  Past Medical History:   Diagnosis Date    Alpha thalassemia trait     Anemia     Cholelithiasis     Elevated liver function tests 12/1/2017    Fatty liver     Menorrhagia     Obesity     Prediabetes        Review of Systems   Constitutional: Negative.    HENT: Negative.    Eyes: Negative.    Respiratory: Negative.    Cardiovascular: Negative.    Gastrointestinal: Negative.    Genitourinary: Negative.    Musculoskeletal: Negative.    Skin: Negative.    Neurological: Negative.    Psychiatric/Behavioral: Negative.      Vitals:    01/11/18 1459   BP: 117/69   Pulse: 82   Resp: 18   Temp: 97.3 °F (36.3 °C)       Physical Exam   Constitutional: She is oriented to person, place, and time. She appears well-nourished.   HENT:   Head: Normocephalic.   Eyes: Pupils are equal, round, and reactive to light. No scleral icterus.   Neck: Neck supple. No thyromegaly present.   Cardiovascular: Normal rate, regular rhythm and normal heart sounds.    Pulmonary/Chest: Effort normal and breath sounds normal. She has no wheezes.   Abdominal: Soft. She exhibits no distension and no mass. There is no tenderness.   Musculoskeletal: Normal range of motion.   Neurological: She is alert and oriented to person, place, and time.   Skin:  Skin is warm and dry. No rash noted.   Psychiatric: She has a normal mood and affect.       MELD-Na score: 7 at 12/1/2017  3:36 PM  MELD score: 7 at 12/1/2017  3:36 PM  Calculated from:  Serum Creatinine: 0.8 mg/dL (Rounded to 1) at 12/1/2017  3:36 PM  Serum Sodium: 138 mmol/L (Rounded to 137) at 12/1/2017  3:36 PM  Total Bilirubin: 0.4 mg/dL (Rounded to 1) at 12/1/2017  3:36 PM  INR(ratio): 1.1 at 12/1/2017  3:36 PM  Age: 32 years    Lab Results   Component Value Date    GLU 84 12/01/2017    BUN 7 12/01/2017    CREATININE 0.8 12/01/2017    CALCIUM 9.1 12/01/2017     12/01/2017    K 4.0 12/01/2017     12/01/2017    PROT 8.0 01/02/2018    CO2 25 12/01/2017    ANIONGAP 7 (L) 12/01/2017    WBC 5.02 12/01/2017    RBC 4.99 12/01/2017    HGB 10.6 (L) 12/01/2017    HCT 35.4 (L) 12/01/2017    MCV 71 (L) 12/01/2017    MCH 21.2 (L) 12/01/2017    MCHC 29.9 (L) 12/01/2017     Lab Results   Component Value Date    RDW 16.1 (H) 12/01/2017     12/01/2017    MPV SEE COMMENT 12/01/2017    GRAN 2.1 12/01/2017    GRAN 42.4 12/01/2017    LYMPH 2.2 12/01/2017    LYMPH 44.6 12/01/2017    MONO 0.5 12/01/2017    MONO 10.6 12/01/2017    EOSINOPHIL 1.8 12/01/2017    BASOPHIL 0.4 12/01/2017    EOS 0.1 12/01/2017    BASO 0.02 12/01/2017    CHOL 168 09/16/2017    TRIG 62 09/16/2017    HDL 39 (L) 09/16/2017    CHOLHDL 23.2 09/16/2017    TOTALCHOLEST 4.3 09/16/2017    ALBUMIN 3.3 (L) 01/02/2018    BILIDIR 0.2 01/02/2018    AST 68 (H) 01/02/2018    ALT 93 (H) 01/02/2018    ALKPHOS 76 01/02/2018    LABPROT 11.3 12/01/2017    INR 1.1 12/01/2017       Assessment and Plan:    Dustin Gandhi is a 32 y.o. female withFatty Liver  I will put her on a list for upcoming clinical trials for treatment of fatty liver. Return 6 months. Consider vit E. Avoid alcohol.

## 2018-09-24 ENCOUNTER — HOSPITAL ENCOUNTER (EMERGENCY)
Facility: HOSPITAL | Age: 33
Discharge: HOME | End: 2018-09-24
Attending: INTERNAL MEDICINE
Payer: COMMERCIAL

## 2018-09-24 VITALS
RESPIRATION RATE: 17 BRPM | OXYGEN SATURATION: 98 % | HEIGHT: 63 IN | TEMPERATURE: 98 F | BODY MASS INDEX: 35.44 KG/M2 | HEART RATE: 74 BPM | SYSTOLIC BLOOD PRESSURE: 137 MMHG | WEIGHT: 200 LBS | DIASTOLIC BLOOD PRESSURE: 84 MMHG

## 2018-09-24 DIAGNOSIS — R10.10 PAIN OF UPPER ABDOMEN: ICD-10-CM

## 2018-09-24 DIAGNOSIS — K80.20 CHOLELITHIASIS WITHOUT CHOLECYSTITIS: Primary | ICD-10-CM

## 2018-09-24 LAB
ALBUMIN SERPL-MCNC: 3.1 G/DL (ref 3.3–5.5)
ALBUMIN SERPL-MCNC: 3.2 G/DL (ref 3.3–5.5)
ALP SERPL-CCNC: 57 U/L (ref 42–141)
ALP SERPL-CCNC: 68 U/L (ref 42–141)
B-HCG UR QL: NEGATIVE
BILIRUB SERPL-MCNC: 0.5 MG/DL (ref 0.2–1.6)
BILIRUB SERPL-MCNC: 0.5 MG/DL (ref 0.2–1.6)
BILIRUBIN, POC UA: NEGATIVE
BLOOD, POC UA: ABNORMAL
BUN SERPL-MCNC: 12 MG/DL (ref 7–22)
CALCIUM SERPL-MCNC: 8.8 MG/DL (ref 8–10.3)
CHLORIDE SERPL-SCNC: 104 MMOL/L (ref 98–108)
CLARITY, POC UA: CLEAR
COLOR, POC UA: ABNORMAL
CREAT SERPL-MCNC: 0.6 MG/DL (ref 0.6–1.2)
CTP QC/QA: YES
GLUCOSE SERPL-MCNC: 159 MG/DL (ref 73–118)
GLUCOSE, POC UA: NEGATIVE
HCO3 UR-SCNC: 22.8 MMOL/L (ref 24–28)
HCO3 UR-SCNC: 25.3 MMOL/L (ref 24–28)
KETONES, POC UA: NEGATIVE
LDH SERPL L TO P-CCNC: 1.56 MMOL/L (ref 0.5–2.2)
LDH SERPL L TO P-CCNC: 2.21 MMOL/L (ref 0.5–2.2)
LEUKOCYTE EST, POC UA: NEGATIVE
NITRITE, POC UA: NEGATIVE
PCO2 BLDA: 33.8 MMHG (ref 35–45)
PCO2 BLDA: 44.1 MMHG (ref 35–45)
PH SMN: 7.32 [PH] (ref 7.35–7.45)
PH SMN: 7.48 [PH] (ref 7.35–7.45)
PH UR STRIP: 5.5 [PH]
PO2 BLDA: 179 MMHG (ref 40–60)
PO2 BLDA: 42 MMHG (ref 40–60)
POC ALT (SGPT): 62 U/L (ref 10–47)
POC ALT (SGPT): 71 U/L (ref 10–47)
POC AMYLASE: 35 U/L (ref 14–97)
POC AST (SGOT): 76 U/L (ref 11–38)
POC AST (SGOT): 76 U/L (ref 11–38)
POC BE: -3 MMOL/L
POC BE: 2 MMOL/L
POC GGT: 64 U/L (ref 5–65)
POC SATURATED O2: 100 % (ref 95–100)
POC SATURATED O2: 73 % (ref 95–100)
POC TCO2: 24 MMOL/L (ref 18–33)
POC TCO2: 24 MMOL/L (ref 24–29)
POC TCO2: 26 MMOL/L (ref 24–29)
POTASSIUM BLD-SCNC: 4.1 MMOL/L (ref 3.6–5.1)
PROTEIN, POC UA: ABNORMAL
PROTEIN, POC: 7.2 G/DL (ref 6.4–8.1)
PROTEIN, POC: 7.7 G/DL (ref 6.4–8.1)
SAMPLE: ABNORMAL
SAMPLE: ABNORMAL
SODIUM BLD-SCNC: 138 MMOL/L (ref 128–145)
SPECIFIC GRAVITY, POC UA: >=1.03
UROBILINOGEN, POC UA: 0.2 E.U./DL

## 2018-09-24 PROCEDURE — 25000003 PHARM REV CODE 250: Performed by: EMERGENCY MEDICINE

## 2018-09-24 PROCEDURE — 82803 BLOOD GASES ANY COMBINATION: CPT

## 2018-09-24 PROCEDURE — 85025 COMPLETE CBC W/AUTO DIFF WBC: CPT

## 2018-09-24 PROCEDURE — 25000003 PHARM REV CODE 250: Performed by: INTERNAL MEDICINE

## 2018-09-24 PROCEDURE — 63600175 PHARM REV CODE 636 W HCPCS: Performed by: INTERNAL MEDICINE

## 2018-09-24 PROCEDURE — 96375 TX/PRO/DX INJ NEW DRUG ADDON: CPT

## 2018-09-24 PROCEDURE — 87040 BLOOD CULTURE FOR BACTERIA: CPT

## 2018-09-24 PROCEDURE — 82150 ASSAY OF AMYLASE: CPT

## 2018-09-24 PROCEDURE — 96374 THER/PROPH/DIAG INJ IV PUSH: CPT

## 2018-09-24 PROCEDURE — 96361 HYDRATE IV INFUSION ADD-ON: CPT

## 2018-09-24 PROCEDURE — 87040 BLOOD CULTURE FOR BACTERIA: CPT | Mod: 59

## 2018-09-24 PROCEDURE — 81025 URINE PREGNANCY TEST: CPT | Performed by: INTERNAL MEDICINE

## 2018-09-24 PROCEDURE — 99284 EMERGENCY DEPT VISIT MOD MDM: CPT | Mod: 25

## 2018-09-24 PROCEDURE — 80053 COMPREHEN METABOLIC PANEL: CPT

## 2018-09-24 RX ORDER — SODIUM CHLORIDE 9 MG/ML
1000 INJECTION, SOLUTION INTRAVENOUS
Status: COMPLETED | OUTPATIENT
Start: 2018-09-24 | End: 2018-09-24

## 2018-09-24 RX ORDER — ONDANSETRON 2 MG/ML
4 INJECTION INTRAMUSCULAR; INTRAVENOUS ONCE
Status: DISCONTINUED | OUTPATIENT
Start: 2018-09-24 | End: 2018-09-24

## 2018-09-24 RX ORDER — TRAMADOL HYDROCHLORIDE 50 MG/1
50 TABLET ORAL EVERY 6 HOURS PRN
Qty: 15 TABLET | Refills: 0 | Status: SHIPPED | OUTPATIENT
Start: 2018-09-24 | End: 2018-10-04

## 2018-09-24 RX ORDER — SODIUM CHLORIDE 9 MG/ML
1000 INJECTION, SOLUTION INTRAVENOUS ONCE
Status: COMPLETED | OUTPATIENT
Start: 2018-09-24 | End: 2018-09-24

## 2018-09-24 RX ORDER — ONDANSETRON 4 MG/1
4 TABLET, FILM COATED ORAL EVERY 6 HOURS PRN
Qty: 15 TABLET | Refills: 0 | Status: SHIPPED | OUTPATIENT
Start: 2018-09-24 | End: 2020-01-02

## 2018-09-24 RX ORDER — KETOROLAC TROMETHAMINE 30 MG/ML
30 INJECTION, SOLUTION INTRAMUSCULAR; INTRAVENOUS
Status: DISCONTINUED | OUTPATIENT
Start: 2018-09-24 | End: 2018-09-24 | Stop reason: CLARIF

## 2018-09-24 RX ORDER — ONDANSETRON 2 MG/ML
8 INJECTION INTRAMUSCULAR; INTRAVENOUS
Status: COMPLETED | OUTPATIENT
Start: 2018-09-24 | End: 2018-09-24

## 2018-09-24 RX ORDER — TRAMADOL HYDROCHLORIDE 50 MG/1
50 TABLET ORAL
Status: COMPLETED | OUTPATIENT
Start: 2018-09-24 | End: 2018-09-24

## 2018-09-24 RX ORDER — KETOROLAC TROMETHAMINE 30 MG/ML
30 INJECTION, SOLUTION INTRAMUSCULAR; INTRAVENOUS
Status: COMPLETED | OUTPATIENT
Start: 2018-09-24 | End: 2018-09-24

## 2018-09-24 RX ADMIN — KETOROLAC TROMETHAMINE 30 MG: 30 INJECTION, SOLUTION INTRAMUSCULAR at 07:09

## 2018-09-24 RX ADMIN — TRAMADOL HYDROCHLORIDE 50 MG: 50 TABLET, FILM COATED ORAL at 11:09

## 2018-09-24 RX ADMIN — ONDANSETRON HYDROCHLORIDE 8 MG: 2 INJECTION, SOLUTION INTRAMUSCULAR; INTRAVENOUS at 07:09

## 2018-09-24 RX ADMIN — SODIUM CHLORIDE 1000 ML: 0.9 INJECTION, SOLUTION INTRAVENOUS at 07:09

## 2018-09-24 RX ADMIN — SODIUM CHLORIDE 1000 ML: 0.9 INJECTION, SOLUTION INTRAVENOUS at 08:09

## 2018-09-24 NOTE — DISCHARGE INSTRUCTIONS
Rest. Drink plenty of fluids. Return for any new or acute problems or concerns, especially fevers, inability to keep anything down, nausea, vomiting, or new abdominal pain.

## 2018-09-24 NOTE — ED PROVIDER NOTES
Encounter Date: 2018       History     Chief Complaint   Patient presents with    Abdominal Pain     RUQ abd pain x 3 hours with nausea     33-year-old female presents to the emergency department complaining of right upper quadrant abdominal pain x3 hours.  She states she has a history of gallstones x1 year, but only remembers 1 previous episode of right upper quadrant abdominal pain. She denies fever/chills/vomiting.      The history is provided by the patient. No  was used.   Abdominal Pain   The current episode started 2 to 3 hours ago. The onset of the illness was gradual. The abdominal pain is located in the RUQ. The abdominal pain does not radiate. The severity of the abdominal pain is 9/10. The abdominal pain is relieved by nothing. The other symptoms of the illness include nausea. The other symptoms of the illness do not include fever, melena, vomiting, diarrhea, dysuria or vaginal discharge.   Nausea began today.   The patient has not had a change in bowel habit. Additional symptoms associated with the illness include diaphoresis. Symptoms associated with the illness do not include chills, heartburn or constipation.     Review of patient's allergies indicates:  No Known Allergies  Past Medical History:   Diagnosis Date    Alpha thalassemia trait     Anemia     Cholelithiasis     Elevated liver function tests 2017    Fatty liver     Menorrhagia     Obesity     Prediabetes      Past Surgical History:   Procedure Laterality Date    BIOPSY LIVER AND ULTRASOUND N/A 2017    [facility]     SECTION, LOW TRANSVERSE      x1    tonsillectomy       Family History   Problem Relation Age of Onset    Hypertension Mother     Diabetes Mother     Heart disease Father         enlarged    Diabetes Maternal Aunt     Diabetes Maternal Uncle     Colon cancer Maternal Uncle     Breast cancer Neg Hx      Social History      Tobacco Use    Smoking status: Never Smoker    Smokeless tobacco: Never Used   Substance Use Topics    Alcohol use: Yes     Comment: occasionally     Drug use: No     Review of Systems   Constitutional: Positive for diaphoresis. Negative for chills and fever.   Gastrointestinal: Positive for abdominal pain and nausea. Negative for anal bleeding, blood in stool, constipation, diarrhea, heartburn, melena, rectal pain and vomiting.   Genitourinary: Negative for dysuria and vaginal discharge.   All other systems reviewed and are negative.      Physical Exam     Initial Vitals [09/24/18 0627]   BP Pulse Resp Temp SpO2   (!) 143/84 85 18 97.9 °F (36.6 °C) 99 %      MAP       --         Physical Exam    Nursing note and vitals reviewed.  Constitutional: She appears well-developed. She appears distressed.   Obese   HENT:   Head: Normocephalic and atraumatic.   Right Ear: External ear normal.   Left Ear: External ear normal.   Eyes: Conjunctivae and EOM are normal.   Neck: Normal range of motion.   Cardiovascular: Normal rate and regular rhythm.   Pulmonary/Chest: Breath sounds normal. No respiratory distress.   Abdominal: Soft. Bowel sounds are normal. She exhibits no mass. There is tenderness (Right upper quadrant). There is no rebound and no guarding.   RUQ ttp   Musculoskeletal: Normal range of motion.   Neurological: She is alert.   Skin: Skin is warm and dry.   Psychiatric: She has a normal mood and affect. Her behavior is normal. Thought content normal.         ED Course   Procedures  Labs Reviewed - No data to display       Imaging Results    None          Medical Decision Making:   Initial Assessment:   33-year-old female presents to the emergency department complaining of right upper quadrant abdominal pain x3 hours.  She states she has a history of gallstones x1 year, but only remembers 1 previous episode of right upper quadrant abdominal pain. She denies fever/chills/vomiting.    Clinical Tests:   Lab  Tests: Ordered and Reviewed  The following lab test(s) were unremarkable: UPT  ED Management:  CBC, CMP, UPT, urinalysis, amylase, venous blood gas with serum lactate, blood culture and ultrasound of the abdomen were ordered and are pending at this time.  Normal saline bolus, Toradol and Zofran were given in the emergency department.  Care of this patient is transferred  to Dr. Worley at 0700 hrs for lab follow-up, further evaluation and disposition.    Pt seen by me at change of shift. Patient with onset of RUQ pain 3 hours ago, nausea, no vomiting. Is fully alert and oriented, non toxic, labs being drawn, pending. US pending.     Ms Gandhi has rested during her time in the ER. Labs noted, wnl, normal wbc, lactate 1.5 after 2 L IVF, she has slept. No further vomiting. She has tolerated po. She would like to f/u w gen surgery as outpt. We discussed home care and worrisome signs that should prompt need to return to er should they occur. There is no indication for further emergent intervention or evaluation at this time.                         Clinical Impression:   The primary encounter diagnosis was Cholelithiasis without cholecystitis. A diagnosis of Pain of upper abdomen was also pertinent to this visit.                             Norberto Worley MD  09/24/18 2903

## 2018-09-29 LAB
BACTERIA BLD CULT: NORMAL
BACTERIA BLD CULT: NORMAL

## 2020-01-03 ENCOUNTER — OFFICE VISIT (OUTPATIENT)
Dept: FAMILY MEDICINE | Facility: CLINIC | Age: 35
End: 2020-01-03
Payer: COMMERCIAL

## 2020-01-03 ENCOUNTER — LAB VISIT (OUTPATIENT)
Dept: LAB | Facility: HOSPITAL | Age: 35
End: 2020-01-03
Attending: INTERNAL MEDICINE
Payer: COMMERCIAL

## 2020-01-03 VITALS
TEMPERATURE: 98 F | SYSTOLIC BLOOD PRESSURE: 122 MMHG | WEIGHT: 220.56 LBS | BODY MASS INDEX: 39.08 KG/M2 | HEART RATE: 77 BPM | DIASTOLIC BLOOD PRESSURE: 86 MMHG | OXYGEN SATURATION: 99 % | HEIGHT: 63 IN

## 2020-01-03 DIAGNOSIS — Z23 NEED FOR STREPTOCOCCUS PNEUMONIAE VACCINATION: ICD-10-CM

## 2020-01-03 DIAGNOSIS — R73.03 PREDIABETES: ICD-10-CM

## 2020-01-03 DIAGNOSIS — K76.0 FATTY LIVER: ICD-10-CM

## 2020-01-03 DIAGNOSIS — Z71.89 ADVANCED DIRECTIVES, COUNSELING/DISCUSSION: ICD-10-CM

## 2020-01-03 DIAGNOSIS — Z00.00 ROUTINE MEDICAL EXAM: Primary | ICD-10-CM

## 2020-01-03 DIAGNOSIS — Z23 NEED FOR VACCINATION AGAINST HEPATITIS A: ICD-10-CM

## 2020-01-03 DIAGNOSIS — Z23 FLU VACCINE NEED: ICD-10-CM

## 2020-01-03 DIAGNOSIS — D56.3 ALPHA THALASSEMIA TRAIT: ICD-10-CM

## 2020-01-03 LAB
ALBUMIN SERPL BCP-MCNC: 3.7 G/DL (ref 3.5–5.2)
ALP SERPL-CCNC: 75 U/L (ref 55–135)
ALT SERPL W/O P-5'-P-CCNC: 75 U/L (ref 10–44)
ANION GAP SERPL CALC-SCNC: 10 MMOL/L (ref 8–16)
AST SERPL-CCNC: 59 U/L (ref 10–40)
BASOPHILS # BLD AUTO: 0.03 K/UL (ref 0–0.2)
BASOPHILS NFR BLD: 0.6 % (ref 0–1.9)
BILIRUB SERPL-MCNC: 0.6 MG/DL (ref 0.1–1)
BUN SERPL-MCNC: 8 MG/DL (ref 6–20)
CALCIUM SERPL-MCNC: 9.5 MG/DL (ref 8.7–10.5)
CHLORIDE SERPL-SCNC: 103 MMOL/L (ref 95–110)
CHOLEST SERPL-MCNC: 203 MG/DL (ref 120–199)
CHOLEST/HDLC SERPL: 3.8 {RATIO} (ref 2–5)
CO2 SERPL-SCNC: 23 MMOL/L (ref 23–29)
CREAT SERPL-MCNC: 0.9 MG/DL (ref 0.5–1.4)
DIFFERENTIAL METHOD: ABNORMAL
EOSINOPHIL # BLD AUTO: 0.1 K/UL (ref 0–0.5)
EOSINOPHIL NFR BLD: 1.6 % (ref 0–8)
ERYTHROCYTE [DISTWIDTH] IN BLOOD BY AUTOMATED COUNT: 16 % (ref 11.5–14.5)
EST. GFR  (AFRICAN AMERICAN): >60 ML/MIN/1.73 M^2
EST. GFR  (NON AFRICAN AMERICAN): >60 ML/MIN/1.73 M^2
ESTIMATED AVG GLUCOSE: 114 MG/DL (ref 68–131)
GLUCOSE SERPL-MCNC: 79 MG/DL (ref 70–110)
HBA1C MFR BLD HPLC: 5.6 % (ref 4–5.6)
HCT VFR BLD AUTO: 40.3 % (ref 37–48.5)
HDLC SERPL-MCNC: 53 MG/DL (ref 40–75)
HDLC SERPL: 26.1 % (ref 20–50)
HGB BLD-MCNC: 11.7 G/DL (ref 12–16)
IMM GRANULOCYTES # BLD AUTO: 0.01 K/UL (ref 0–0.04)
IMM GRANULOCYTES NFR BLD AUTO: 0.2 % (ref 0–0.5)
LDLC SERPL CALC-MCNC: 135.8 MG/DL (ref 63–159)
LYMPHOCYTES # BLD AUTO: 2.3 K/UL (ref 1–4.8)
LYMPHOCYTES NFR BLD: 45.7 % (ref 18–48)
MCH RBC QN AUTO: 21.9 PG (ref 27–31)
MCHC RBC AUTO-ENTMCNC: 29 G/DL (ref 32–36)
MCV RBC AUTO: 75 FL (ref 82–98)
MONOCYTES # BLD AUTO: 0.8 K/UL (ref 0.3–1)
MONOCYTES NFR BLD: 14.9 % (ref 4–15)
NEUTROPHILS # BLD AUTO: 1.9 K/UL (ref 1.8–7.7)
NEUTROPHILS NFR BLD: 37 % (ref 38–73)
NONHDLC SERPL-MCNC: 150 MG/DL
NRBC BLD-RTO: 0 /100 WBC
PLATELET # BLD AUTO: 173 K/UL (ref 150–350)
PMV BLD AUTO: ABNORMAL FL (ref 9.2–12.9)
POTASSIUM SERPL-SCNC: 4.3 MMOL/L (ref 3.5–5.1)
PROT SERPL-MCNC: 8.5 G/DL (ref 6–8.4)
RBC # BLD AUTO: 5.35 M/UL (ref 4–5.4)
SODIUM SERPL-SCNC: 136 MMOL/L (ref 136–145)
TRIGL SERPL-MCNC: 71 MG/DL (ref 30–150)
WBC # BLD AUTO: 5.05 K/UL (ref 3.9–12.7)

## 2020-01-03 PROCEDURE — 99999 PR PBB SHADOW E&M-EST. PATIENT-LVL III: CPT | Mod: PBBFAC,,, | Performed by: INTERNAL MEDICINE

## 2020-01-03 PROCEDURE — 90632 HEPATITIS A VACCINE ADULT IM: ICD-10-PCS | Mod: S$GLB,,, | Performed by: INTERNAL MEDICINE

## 2020-01-03 PROCEDURE — 99395 PREV VISIT EST AGE 18-39: CPT | Mod: 25,S$GLB,, | Performed by: INTERNAL MEDICINE

## 2020-01-03 PROCEDURE — 80053 COMPREHEN METABOLIC PANEL: CPT

## 2020-01-03 PROCEDURE — 90471 IMMUNIZATION ADMIN: CPT | Mod: S$GLB,,, | Performed by: INTERNAL MEDICINE

## 2020-01-03 PROCEDURE — 90472 HEPATITIS A VACCINE ADULT IM: ICD-10-PCS | Mod: S$GLB,,, | Performed by: INTERNAL MEDICINE

## 2020-01-03 PROCEDURE — 99999 PR PBB SHADOW E&M-EST. PATIENT-LVL III: ICD-10-PCS | Mod: PBBFAC,,, | Performed by: INTERNAL MEDICINE

## 2020-01-03 PROCEDURE — 90686 IIV4 VACC NO PRSV 0.5 ML IM: CPT | Mod: S$GLB,,, | Performed by: INTERNAL MEDICINE

## 2020-01-03 PROCEDURE — 90632 HEPA VACCINE ADULT IM: CPT | Mod: S$GLB,,, | Performed by: INTERNAL MEDICINE

## 2020-01-03 PROCEDURE — 83036 HEMOGLOBIN GLYCOSYLATED A1C: CPT

## 2020-01-03 PROCEDURE — 90686 FLU VACCINE (QUAD) GREATER THAN OR EQUAL TO 3YO PRESERVATIVE FREE IM: ICD-10-PCS | Mod: S$GLB,,, | Performed by: INTERNAL MEDICINE

## 2020-01-03 PROCEDURE — 80061 LIPID PANEL: CPT

## 2020-01-03 PROCEDURE — 99395 PR PREVENTIVE VISIT,EST,18-39: ICD-10-PCS | Mod: 25,S$GLB,, | Performed by: INTERNAL MEDICINE

## 2020-01-03 PROCEDURE — 90472 IMMUNIZATION ADMIN EACH ADD: CPT | Mod: S$GLB,,, | Performed by: INTERNAL MEDICINE

## 2020-01-03 PROCEDURE — 90471 FLU VACCINE (QUAD) GREATER THAN OR EQUAL TO 3YO PRESERVATIVE FREE IM: ICD-10-PCS | Mod: S$GLB,,, | Performed by: INTERNAL MEDICINE

## 2020-01-03 PROCEDURE — 36415 COLL VENOUS BLD VENIPUNCTURE: CPT | Mod: PO

## 2020-01-03 PROCEDURE — 85025 COMPLETE CBC W/AUTO DIFF WBC: CPT

## 2020-01-03 NOTE — PROGRESS NOTES
Patient tolerated flu injection and Hep A #1. Patient waited 15min. Patient received VIS information

## 2020-01-03 NOTE — PROGRESS NOTES
HISTORY OF PRESENT ILLNESS:  Dustin Gandhi is a 34 y.o. female who presents to the clinic today for a routine physical exam. Her last physical exam was approximately 2 years(s) ago.    Contraception: no method      PAST MEDICAL HISTORY:  Past Medical History:   Diagnosis Date    Alpha thalassemia trait     Anemia     Cholelithiasis     Elevated liver function tests 2017    Fatty liver     Menorrhagia     Obesity     Prediabetes        PAST SURGICAL HISTORY:  Past Surgical History:   Procedure Laterality Date     SECTION, LOW TRANSVERSE      x1    tonsillectomy         SOCIAL HISTORY:  Social History     Socioeconomic History    Marital status: Single     Spouse name: Not on file    Number of children: 1    Years of education: Not on file    Highest education level: Not on file   Occupational History    Occupation: lab worker   Social Needs    Financial resource strain: Not very hard    Food insecurity:     Worry: Never true     Inability: Never true    Transportation needs:     Medical: No     Non-medical: No   Tobacco Use    Smoking status: Never Smoker    Smokeless tobacco: Never Used   Substance and Sexual Activity    Alcohol use: Yes     Frequency: Monthly or less     Drinks per session: 1 or 2     Binge frequency: Never     Comment: occasionally     Drug use: No    Sexual activity: Yes     Partners: Male     Birth control/protection: Condom   Lifestyle    Physical activity:     Days per week: 0 days     Minutes per session: Not on file    Stress: Only a little   Relationships    Social connections:     Talks on phone: More than three times a week     Gets together: Once a week     Attends Yarsanism service: Not on file     Active member of club or organization: No     Attends meetings of clubs or organizations: Never     Relationship status: Living with partner   Other Topics Concern    Not on file   Social History Narrative    Not on file       FAMILY HISTORY:  Family  History   Problem Relation Age of Onset    Hypertension Mother     Diabetes Mother     Heart disease Father         enlarged    Diabetes Maternal Aunt     Diabetes Maternal Uncle     Colon cancer Maternal Uncle     Breast cancer Neg Hx        ALLERGIES AND MEDICATIONS: updated and reviewed.  Review of patient's allergies indicates:  No Known Allergies  Medication List with Changes/Refills   Discontinued Medications    FERROUS SULFATE 324 MG (65 MG IRON) TBEC    Take 325 mg by mouth once daily.    ONDANSETRON (ZOFRAN) 4 MG TABLET    Take 1 tablet (4 mg total) by mouth every 6 (six) hours as needed for Nausea.         CARE TEAM:  Patient Care Team:  Sharron Valdes MD as PCP - General (Internal Medicine)           SCREENING HISTORY:  Health Maintenance       Date Due Completion Date    Hepatitis A Vaccines (1 of 2 - Risk 2-dose series) 04/12/1984 ---    Pneumococcal Vaccine (Highest Risk) (2 of 3 - PPSV23) 11/20/2017 9/25/2017    Influenza Vaccine (1) 09/01/2019 9/25/2017    Pap Smear with HPV Cotest 09/25/2020 9/25/2017    Override on 1/6/2012: Done    Override on 12/10/2008: Done    TETANUS VACCINE 01/06/2022 1/6/2012            REVIEW OF SYSTEMS:   The patient reports: they are improving their dietary habits - plans to decrease starch and increase lean meat/vegetables.  The patient reports : that they do not exercise.  Review of Systems   Constitutional: Negative for activity change, chills, fatigue, fever and unexpected weight change.   HENT: Negative for congestion, hearing loss, postnasal drip, rhinorrhea and trouble swallowing.    Eyes: Negative for pain, discharge and visual disturbance.   Respiratory: Negative for cough, chest tightness, shortness of breath and wheezing.    Cardiovascular: Negative for chest pain, palpitations and leg swelling.   Gastrointestinal: Negative for abdominal pain, blood in stool, constipation, diarrhea, nausea and vomiting.   Endocrine: Negative for polydipsia and  "polyuria.   Genitourinary: Negative for difficulty urinating, dysuria, hematuria and menstrual problem.   Musculoskeletal: Negative for arthralgias, back pain, joint swelling and neck pain.   Skin: Negative for rash.   Neurological: Negative for weakness and headaches.   Psychiatric/Behavioral: Negative for confusion, dysphoric mood and sleep disturbance. The patient is not nervous/anxious.       ROS (Optional)-: no pelvic pain  Breast ROS (Optional)-: negative for breast lumps/discharge        Physical Examination:   Vitals:    01/03/20 1004   BP: 122/86   Pulse: 77   Temp: 98 °F (36.7 °C)     Weight: 100 kg (220 lb 9.1 oz)   Height: 5' 3" (160 cm)   Body mass index is 39.07 kg/m².      Patient did not require to have a chaperone present during the exam today.    General appearance - alert, well appearing, and in no distress, obese  Psychiatric - alert, oriented to person, place, and time, normal mood, behavior, speech, dress, motor activity, and thought processes  Eyes - pupils equal and reactive, extraocular eye movements intact, sclera anicteric  Ears - right ear normal, left TM/ear canal obscured by cerumen  Nose - normal and patent, no erythema, discharge or polyps  Mouth - mucous membranes moist, pharynx normal without lesions  Neck - supple, no significant adenopathy, carotids upstroke normal bilaterally, no bruits, thyroid exam: thyroid is normal in size without nodules or tenderness  Lymphatics - no palpable cervical lymphadenopathy  Chest - clear to auscultation, no wheezes, rales or rhonchi, symmetric air entry  Heart - normal rate and regular rhythm, no gallops noted  Abdomen - soft, nontender, nondistended, no masses or organomegaly  Breasts - not examined  Back exam - full range of motion, no tenderness, palpable spasm or pain on motion  Neurological - alert, normal speech, no focal findings or movement disorder noted, cranial nerves II through XII intact  Musculoskeletal - no joint tenderness, " deformity or swelling, no muscular tenderness noted  Extremities - peripheral pulses normal, no pedal edema, no clubbing or cyanosis  Skin - normal coloration and turgor, no rashes, no suspicious skin lesions noted      Labs:  Ordered/scheduled today      ASSESSMENT AND PLAN:  1. Routine medical exam  Counseled on age appropriate medical preventative services including age appropriate cancer screenings, age appropriate eye and dental exams, over all nutritional health, need for a consistent exercise regimen, and an over all push towards maintaining a vigorous and active lifestyle.  Counseled on age appropriate vaccines and discussed upcoming health care needs based on age/gender. Discussed good sleep hygiene and stress management.    2. Prediabetes  Stable. We discussed low sugar/low carbohydrate diet and regular exercise to prevent progression. No need for prescription medication at this time.  - Lipid panel; Future  - Hemoglobin A1c; Future  - Comprehensive metabolic panel; Future  - CBC auto differential; Future    3. Fatty liver  Asymptomatic. We discussed the need for weight loss to prevent progression to cirrhosis of the liver. LFTs elevated. Patient has seen hepatology in the past. Not elligible for research study as she is still of child bearing age.    4. Alpha thalassemia trait  Stable. Asymptomatic. Observe.    5. Flu vaccine need    - Influenza - Quadrivalent (6 months+) (PF)    6. Need for Streptococcus pneumoniae vaccination  Will complete in 6 months when she comes back for second hep A shot.    7. Advanced directives, counseling/discussion  Advance Care Planning   I initiated the process and explained the importance of advance care planning today with the patient.  Advanced directives were discussed due to patient's age and/or chronic medical conditions. Prognosis based on current condition: good.   Paperwork was given to complete living will (at this point in time, the patient does have full  decision-making capacity.  We discussed different extreme health states that she could experience, and reviewed what kind of medical care she would want in those situations) and medical POA (The patient received detailed information about the importance of designating a Health Care Power of . She was also instructed to communicate with this person about their wishes for future healthcare, should she become sick and lose decision-making capacity).   [place] was not discussed.   Approximately 3 minute(s) were spent on counseling/discussion regarding end of life decision making.           8. Need for vaccination against hepatitis A    - (In Office Administered) Hepatitis A Vaccine (Adult) (IM)           Follow up in about 1 year (around 1/3/2021), or if symptoms worsen or fail to improve, for annual exam. or sooner as needed.

## 2021-08-26 ENCOUNTER — TELEPHONE (OUTPATIENT)
Dept: FAMILY MEDICINE | Facility: CLINIC | Age: 36
End: 2021-08-26

## 2021-08-26 DIAGNOSIS — R73.03 PREDIABETES: Primary | ICD-10-CM

## 2021-08-27 ENCOUNTER — LAB VISIT (OUTPATIENT)
Dept: LAB | Facility: HOSPITAL | Age: 36
End: 2021-08-27
Attending: INTERNAL MEDICINE
Payer: COMMERCIAL

## 2021-08-27 DIAGNOSIS — R73.03 PREDIABETES: ICD-10-CM

## 2021-08-27 LAB
ALBUMIN SERPL BCP-MCNC: 3.2 G/DL (ref 3.5–5.2)
ALP SERPL-CCNC: 92 U/L (ref 55–135)
ALT SERPL W/O P-5'-P-CCNC: 162 U/L (ref 10–44)
ANION GAP SERPL CALC-SCNC: 12 MMOL/L (ref 8–16)
AST SERPL-CCNC: 104 U/L (ref 10–40)
BILIRUB SERPL-MCNC: 0.6 MG/DL (ref 0.1–1)
BUN SERPL-MCNC: 8 MG/DL (ref 6–20)
CALCIUM SERPL-MCNC: 8.9 MG/DL (ref 8.7–10.5)
CHLORIDE SERPL-SCNC: 105 MMOL/L (ref 95–110)
CHOLEST SERPL-MCNC: 175 MG/DL (ref 120–199)
CHOLEST/HDLC SERPL: 4.3 {RATIO} (ref 2–5)
CO2 SERPL-SCNC: 21 MMOL/L (ref 23–29)
CREAT SERPL-MCNC: 0.8 MG/DL (ref 0.5–1.4)
EST. GFR  (AFRICAN AMERICAN): >60 ML/MIN/1.73 M^2
EST. GFR  (NON AFRICAN AMERICAN): >60 ML/MIN/1.73 M^2
GLUCOSE SERPL-MCNC: 83 MG/DL (ref 70–110)
HDLC SERPL-MCNC: 41 MG/DL (ref 40–75)
HDLC SERPL: 23.4 % (ref 20–50)
LDLC SERPL CALC-MCNC: 120 MG/DL (ref 63–159)
NONHDLC SERPL-MCNC: 134 MG/DL
POTASSIUM SERPL-SCNC: 4.2 MMOL/L (ref 3.5–5.1)
PROT SERPL-MCNC: 8 G/DL (ref 6–8.4)
SODIUM SERPL-SCNC: 138 MMOL/L (ref 136–145)
TRIGL SERPL-MCNC: 70 MG/DL (ref 30–150)

## 2021-08-27 PROCEDURE — 36415 COLL VENOUS BLD VENIPUNCTURE: CPT | Mod: PO | Performed by: INTERNAL MEDICINE

## 2021-08-27 PROCEDURE — 80053 COMPREHEN METABOLIC PANEL: CPT | Performed by: INTERNAL MEDICINE

## 2021-08-27 PROCEDURE — 80061 LIPID PANEL: CPT | Performed by: INTERNAL MEDICINE

## 2021-08-27 PROCEDURE — 85025 COMPLETE CBC W/AUTO DIFF WBC: CPT | Performed by: INTERNAL MEDICINE

## 2021-08-27 PROCEDURE — 83036 HEMOGLOBIN GLYCOSYLATED A1C: CPT | Performed by: INTERNAL MEDICINE

## 2021-08-28 LAB
ANISOCYTOSIS BLD QL SMEAR: SLIGHT
BASOPHILS # BLD AUTO: 0.06 K/UL (ref 0–0.2)
BASOPHILS NFR BLD: 1 % (ref 0–1.9)
BURR CELLS BLD QL SMEAR: ABNORMAL
DIFFERENTIAL METHOD: ABNORMAL
EOSINOPHIL # BLD AUTO: 0.1 K/UL (ref 0–0.5)
EOSINOPHIL NFR BLD: 1.6 % (ref 0–8)
ERYTHROCYTE [DISTWIDTH] IN BLOOD BY AUTOMATED COUNT: 17 % (ref 11.5–14.5)
ESTIMATED AVG GLUCOSE: 123 MG/DL (ref 68–131)
HBA1C MFR BLD: 5.9 % (ref 4–5.6)
HCT VFR BLD AUTO: 38.6 % (ref 37–48.5)
HGB BLD-MCNC: 11.5 G/DL (ref 12–16)
IMM GRANULOCYTES # BLD AUTO: 0.03 K/UL (ref 0–0.04)
IMM GRANULOCYTES NFR BLD AUTO: 0.5 % (ref 0–0.5)
LYMPHOCYTES # BLD AUTO: 3.3 K/UL (ref 1–4.8)
LYMPHOCYTES NFR BLD: 52.9 % (ref 18–48)
MCH RBC QN AUTO: 22.2 PG (ref 27–31)
MCHC RBC AUTO-ENTMCNC: 29.8 G/DL (ref 32–36)
MCV RBC AUTO: 74 FL (ref 82–98)
MONOCYTES # BLD AUTO: 0.6 K/UL (ref 0.3–1)
MONOCYTES NFR BLD: 10 % (ref 4–15)
NEUTROPHILS # BLD AUTO: 2.1 K/UL (ref 1.8–7.7)
NEUTROPHILS NFR BLD: 34 % (ref 38–73)
NRBC BLD-RTO: 0 /100 WBC
OVALOCYTES BLD QL SMEAR: ABNORMAL
PLATELET # BLD AUTO: 184 K/UL (ref 150–450)
PLATELET BLD QL SMEAR: ABNORMAL
PMV BLD AUTO: ABNORMAL FL (ref 9.2–12.9)
POIKILOCYTOSIS BLD QL SMEAR: ABNORMAL
RBC # BLD AUTO: 5.19 M/UL (ref 4–5.4)
WBC # BLD AUTO: 6.18 K/UL (ref 3.9–12.7)

## 2021-10-27 ENCOUNTER — TELEPHONE (OUTPATIENT)
Dept: FAMILY MEDICINE | Facility: CLINIC | Age: 36
End: 2021-10-27
Payer: COMMERCIAL

## 2022-01-12 ENCOUNTER — OFFICE VISIT (OUTPATIENT)
Dept: FAMILY MEDICINE | Facility: CLINIC | Age: 37
End: 2022-01-12
Payer: COMMERCIAL

## 2022-01-12 VITALS
DIASTOLIC BLOOD PRESSURE: 78 MMHG | OXYGEN SATURATION: 96 % | HEART RATE: 83 BPM | SYSTOLIC BLOOD PRESSURE: 112 MMHG | HEIGHT: 63 IN | WEIGHT: 241.06 LBS | TEMPERATURE: 98 F | BODY MASS INDEX: 42.71 KG/M2

## 2022-01-12 DIAGNOSIS — Z23 NEED FOR STREPTOCOCCUS PNEUMONIAE VACCINATION: ICD-10-CM

## 2022-01-12 DIAGNOSIS — E66.01 MORBID OBESITY WITH BMI OF 40.0-44.9, ADULT: ICD-10-CM

## 2022-01-12 DIAGNOSIS — Z23 FLU VACCINE NEED: ICD-10-CM

## 2022-01-12 DIAGNOSIS — R73.03 PREDIABETES: ICD-10-CM

## 2022-01-12 DIAGNOSIS — Z12.4 SCREENING FOR CERVICAL CANCER: ICD-10-CM

## 2022-01-12 DIAGNOSIS — Z01.419 ROUTINE GYNECOLOGICAL EXAMINATION: ICD-10-CM

## 2022-01-12 DIAGNOSIS — K76.0 FATTY LIVER: ICD-10-CM

## 2022-01-12 DIAGNOSIS — Z23 NEED FOR HEPATITIS A VACCINATION: ICD-10-CM

## 2022-01-12 DIAGNOSIS — G47.9 SLEEP DISTURBANCE: ICD-10-CM

## 2022-01-12 DIAGNOSIS — D56.3 ALPHA THALASSEMIA TRAIT: ICD-10-CM

## 2022-01-12 DIAGNOSIS — R51.9 NONINTRACTABLE HEADACHE, UNSPECIFIED CHRONICITY PATTERN, UNSPECIFIED HEADACHE TYPE: ICD-10-CM

## 2022-01-12 DIAGNOSIS — Z00.00 ROUTINE MEDICAL EXAM: Primary | ICD-10-CM

## 2022-01-12 PROCEDURE — 3078F DIAST BP <80 MM HG: CPT | Mod: CPTII,S$GLB,, | Performed by: INTERNAL MEDICINE

## 2022-01-12 PROCEDURE — 3008F PR BODY MASS INDEX (BMI) DOCUMENTED: ICD-10-PCS | Mod: CPTII,S$GLB,, | Performed by: INTERNAL MEDICINE

## 2022-01-12 PROCEDURE — 1159F PR MEDICATION LIST DOCUMENTED IN MEDICAL RECORD: ICD-10-PCS | Mod: CPTII,S$GLB,, | Performed by: INTERNAL MEDICINE

## 2022-01-12 PROCEDURE — 90686 IIV4 VACC NO PRSV 0.5 ML IM: CPT | Mod: S$GLB,,, | Performed by: INTERNAL MEDICINE

## 2022-01-12 PROCEDURE — 3074F PR MOST RECENT SYSTOLIC BLOOD PRESSURE < 130 MM HG: ICD-10-PCS | Mod: CPTII,S$GLB,, | Performed by: INTERNAL MEDICINE

## 2022-01-12 PROCEDURE — 87624 HPV HI-RISK TYP POOLED RSLT: CPT | Performed by: INTERNAL MEDICINE

## 2022-01-12 PROCEDURE — 1160F RVW MEDS BY RX/DR IN RCRD: CPT | Mod: CPTII,S$GLB,, | Performed by: INTERNAL MEDICINE

## 2022-01-12 PROCEDURE — 90633 HEPATITIS A VACCINE PEDIATRIC / ADOLESCENT 2 DOSE IM: ICD-10-PCS | Mod: S$GLB,,, | Performed by: INTERNAL MEDICINE

## 2022-01-12 PROCEDURE — 90732 PNEUMOCOCCAL POLYSACCHARIDE VACCINE 23-VALENT =>2YO SQ IM: ICD-10-PCS | Mod: S$GLB,,, | Performed by: INTERNAL MEDICINE

## 2022-01-12 PROCEDURE — 90471 IMMUNIZATION ADMIN: CPT | Mod: S$GLB,,, | Performed by: INTERNAL MEDICINE

## 2022-01-12 PROCEDURE — 99395 PREV VISIT EST AGE 18-39: CPT | Mod: 25,S$GLB,, | Performed by: INTERNAL MEDICINE

## 2022-01-12 PROCEDURE — 90472 IMMUNIZATION ADMIN EACH ADD: CPT | Mod: S$GLB,,, | Performed by: INTERNAL MEDICINE

## 2022-01-12 PROCEDURE — 3008F BODY MASS INDEX DOCD: CPT | Mod: CPTII,S$GLB,, | Performed by: INTERNAL MEDICINE

## 2022-01-12 PROCEDURE — 90686 FLU VACCINE (QUAD) GREATER THAN OR EQUAL TO 3YO PRESERVATIVE FREE IM: ICD-10-PCS | Mod: S$GLB,,, | Performed by: INTERNAL MEDICINE

## 2022-01-12 PROCEDURE — 90472 FLU VACCINE (QUAD) GREATER THAN OR EQUAL TO 3YO PRESERVATIVE FREE IM: ICD-10-PCS | Mod: S$GLB,,, | Performed by: INTERNAL MEDICINE

## 2022-01-12 PROCEDURE — 90633 HEPA VACC PED/ADOL 2 DOSE IM: CPT | Mod: S$GLB,,, | Performed by: INTERNAL MEDICINE

## 2022-01-12 PROCEDURE — 90732 PPSV23 VACC 2 YRS+ SUBQ/IM: CPT | Mod: S$GLB,,, | Performed by: INTERNAL MEDICINE

## 2022-01-12 PROCEDURE — 1159F MED LIST DOCD IN RCRD: CPT | Mod: CPTII,S$GLB,, | Performed by: INTERNAL MEDICINE

## 2022-01-12 PROCEDURE — 1160F PR REVIEW ALL MEDS BY PRESCRIBER/CLIN PHARMACIST DOCUMENTED: ICD-10-PCS | Mod: CPTII,S$GLB,, | Performed by: INTERNAL MEDICINE

## 2022-01-12 PROCEDURE — 3078F PR MOST RECENT DIASTOLIC BLOOD PRESSURE < 80 MM HG: ICD-10-PCS | Mod: CPTII,S$GLB,, | Performed by: INTERNAL MEDICINE

## 2022-01-12 PROCEDURE — 3074F SYST BP LT 130 MM HG: CPT | Mod: CPTII,S$GLB,, | Performed by: INTERNAL MEDICINE

## 2022-01-12 PROCEDURE — 99999 PR PBB SHADOW E&M-EST. PATIENT-LVL III: ICD-10-PCS | Mod: PBBFAC,,, | Performed by: INTERNAL MEDICINE

## 2022-01-12 PROCEDURE — 90471 PNEUMOCOCCAL POLYSACCHARIDE VACCINE 23-VALENT =>2YO SQ IM: ICD-10-PCS | Mod: S$GLB,,, | Performed by: INTERNAL MEDICINE

## 2022-01-12 PROCEDURE — 99999 PR PBB SHADOW E&M-EST. PATIENT-LVL III: CPT | Mod: PBBFAC,,, | Performed by: INTERNAL MEDICINE

## 2022-01-12 PROCEDURE — 99395 PR PREVENTIVE VISIT,EST,18-39: ICD-10-PCS | Mod: 25,S$GLB,, | Performed by: INTERNAL MEDICINE

## 2022-01-12 NOTE — PROGRESS NOTES
HISTORY OF PRESENT ILLNESS:  Dustin Gandhi is a 36 y.o. female who presents to the clinic today for a routine physical exam. Her last physical exam was approximately 1 years(s) ago.    Contraception: no method; she is considering getting her tubes tied      PAST MEDICAL HISTORY:  Past Medical History:   Diagnosis Date    Alpha thalassemia trait     Anemia     Cholelithiasis     Elevated liver function tests 2017    Fatty liver     Menorrhagia     Obesity     Prediabetes        PAST SURGICAL HISTORY:  Past Surgical History:   Procedure Laterality Date     SECTION, LOW TRANSVERSE      x1    tonsillectomy         SOCIAL HISTORY:  Social History     Socioeconomic History    Marital status: Single    Number of children: 1   Occupational History    Occupation: lab worker   Tobacco Use    Smoking status: Never Smoker    Smokeless tobacco: Never Used   Substance and Sexual Activity    Alcohol use: Yes     Comment: occasionally     Drug use: No    Sexual activity: Yes     Partners: Male     Birth control/protection: Condom       FAMILY HISTORY:  Family History   Problem Relation Age of Onset    Hypertension Mother     Diabetes Mother     Heart disease Father         enlarged    Diabetes Maternal Aunt     Diabetes Maternal Uncle     Colon cancer Maternal Uncle     Breast cancer Neg Hx        ALLERGIES AND MEDICATIONS: updated and reviewed.  Review of patient's allergies indicates:  No Known Allergies        CARE TEAM:  Patient Care Team:  Sharron Valdes MD as PCP - General (Internal Medicine)           SCREENING HISTORY:  Health Maintenance       Date Due Completion Date    Cervical Cancer Screening 2020    Override on 2012: Done    Override on 12/10/2008: Done    COVID-19 Vaccine (3 - Booster for Moderna series) 10/15/2021 4/15/2021    TETANUS VACCINE 2022    Pneumococcal Vaccines (Age 0-64) (3 of 4 - PPSV23) 2027            REVIEW  "OF SYSTEMS:   The patient reports: she is interested in focusing on weight loss; she is asking for advise on diet and possible Adipex  The patient reports : that they do not exercise regularly  Review of Systems   Constitutional: Positive for fatigue (- only getting about 5 hours of sleep/night; has been told she snores). Negative for chills, fever and unexpected weight change.   HENT: Negative for congestion and postnasal drip.    Eyes: Negative for pain and visual disturbance.   Respiratory: Negative for cough, shortness of breath and wheezing.    Cardiovascular: Negative for chest pain, palpitations and leg swelling.   Gastrointestinal: Negative for abdominal pain, constipation, diarrhea, nausea and vomiting.   Genitourinary: Negative for dysuria.   Musculoskeletal: Negative for arthralgias and back pain.   Skin: Negative for rash.   Neurological: Positive for headaches (- takes Aleve about twice a week; thinks it is stress related). Negative for weakness.   Psychiatric/Behavioral: Negative for dysphoric mood and sleep disturbance. The patient is not nervous/anxious.       ROS (Optional)-: no pelvic pain  Breast ROS (Optional)-: negative for breast lumps/discharge        Physical Examination:   Vitals:    01/12/22 1003   BP: 112/78   Pulse: 83   Temp: 97.6 °F (36.4 °C)     Weight: 109.4 kg (241 lb 1.2 oz)   Height: 5' 3" (160 cm)   Body mass index is 42.7 kg/m².      Patient declined to have a chaperone present during the exam today.    General appearance - alert, well appearing, and in no distress, morbidly obese  Psychiatric - alert, oriented to person, place, and time, normal behavior, speech, dress, motor activity and thought process  Eyes - pupils equal and reactive, extraocular eye movements intact, sclera anicteric  Mouth - Mallampati 3-4  Neck - supple, no significant adenopathy, carotids upstroke normal bilaterally, no bruits  Lymphatics - no palpable cervical lymphadenopathy  Chest - clear to " auscultation, no wheezes, rales or rhonchi, symmetric air entry  Heart - normal rate and regular rhythm, no gallops noted  Abdomen - soft, nontender, nondistended, no masses or organomegaly  Pelvic - normal external genitalia, vulva, vagina, cervix, uterus and adnexa  Neurological - alert, normal speech, no focal findings or movement disorder noted, cranial nerves II through XII intact  Musculoskeletal - no joint tenderness, deformity or swelling, no muscular tenderness noted  Extremities - peripheral pulses normal, no pedal edema, no clubbing or cyanosis  Skin - normal coloration and turgor, no rashes, no suspicious skin lesions noted      Labs:  No labs needed at this time      ASSESSMENT AND PLAN:  1. Routine medical exam  Counseled on age appropriate medical preventative services including age appropriate cancer screenings, age appropriate eye and dental exams, over all nutritional health, need for a consistent exercise regimen, and an over all push towards maintaining a vigorous and active lifestyle.  Counseled on age appropriate vaccines and discussed upcoming health care needs based on age/gender. Discussed good sleep hygiene and stress management.    2. Routine gynecological examination/3. Screening for cervical cancer    - Liquid-Based Pap Smear, Screening  - HPV High Risk Genotypes, PCR    4. Prediabetes  Stable. We discussed low sugar/low carbohydrate diet and regular exercise to prevent progression. No need for prescription medication at this time.    5. Alpha thalassemia trait  Stable. Asymptomatic. Observe.    6. Fatty liver  Asymptomatic. We discussed the need for weight loss to prevent progression to cirrhosis of the liver. LFTs elevated. Patient is followed by hepatology.    7. Morbid obesity (BMI 40.0-44.9) with comorbidity  The patient is asked to make an attempt to improve diet and exercise patterns to aid in medical management of this problem. We discussed documenting daily calorie intake and  limiting daily added sugar intake (5g/serving of any product; 20 g/day total). Can look into Adipex, but discussed that she may want to consider [facility] instead for cognitive behavioral therapy instead.    8. Need for Streptococcus pneumoniae vaccination    - (In Office Administered) Pneumococcal Polysaccharide Vaccine (23 Valent) (SQ/IM)    9. Need for hepatitis A vaccination    - (In Office Administered) Hepatitis A Vaccine (Pediatric/Adolescent) (2 Dose) (IM)    10. Flu vaccine need    - Influenza - Quadrivalent *Preferred* (6 months+) (PF)    11. Nonintractable headache, unspecified chronicity pattern, unspecified headache type/12. Sleep disturbance  We discussed that her headaches could be stress related but could also be possibly due to sleep apnea.  We discussed getting good sleep, regular physical activity, and good healthy dietary habits.  We discussed that taking medication a few days a week for headaches is okay, but taking it more frequently might result in rebound headaches.  Consider further testing for sleep apnea if symptoms worsen or persist despite weight loss and improved sleep habits.    12. Sleep disturbance  EPWORTH SLEEPINESS SCALE 1/12/2022   Sitting and reading 2   Watching TV 3   Sitting, inactive in a public place (e.g. a theatre or a meeting) 2   As a passenger in a car for an hour without a break 0   Lying down to rest in the afternoon when circumstances permit 3   Sitting and talking to someone 0   Sitting quietly after a lunch without alcohol 3   In a car, while stopped for a few minutes in traffic 0   Total score 13     We discussed the ramifications of untreated sleep apnea including CHF, heart arrhythmias, and headaches to name a few.           Orders Placed This Encounter   Procedures    HPV High Risk Genotypes, PCR    (In Office Administered) Pneumococcal Polysaccharide Vaccine (23 Valent) (SQ/IM)    Influenza - Quadrivalent *Preferred* (6 months+) (PF)    (In Office  Administered) Hepatitis A Vaccine (Pediatric/Adolescent) (2 Dose) (IM)      Follow up in about 1 year (around 1/12/2023), or if symptoms worsen or fail to improve, for annual exam. or sooner as needed.

## 2022-01-12 NOTE — PROGRESS NOTES
Patient given pneumonia & flu injection. Tolerated well. VIS given & advised to wait in lobby 15 mins. Verbalized understanding.

## 2022-01-18 LAB
CYTOLOGIST CVX/VAG CYTO: NORMAL
CYTOLOGY CVX/VAG DOC CYTO: NORMAL
CYTOLOGY CVX/VAG DOC THIN PREP: NORMAL
CYTOLOGY THINPREP PAP COMMENT: NORMAL
HPV HR 12 DNA CVX QL NAA+PROBE: NEGATIVE
HPV16 DNA CVX QL NAA+PROBE: NEGATIVE
HPV18 DNA CVX QL NAA+PROBE: NEGATIVE
PAP NOTE: NORMAL
STAT OF ADQ CVX/VAG CYTO-IMP: NORMAL

## 2023-02-01 ENCOUNTER — PATIENT OUTREACH (OUTPATIENT)
Dept: ADMINISTRATIVE | Facility: HOSPITAL | Age: 38
End: 2023-02-01
Payer: COMMERCIAL

## 2023-02-01 NOTE — PROGRESS NOTES
[facility] 1 Panel Continuity 01.17.2023 - unable to reach patient, no answer. The phone just rings. Confirm if Dr. Valdes is still her primary care physician / annual visit needed.

## 2023-04-26 ENCOUNTER — TELEPHONE (OUTPATIENT)
Dept: FAMILY MEDICINE | Facility: CLINIC | Age: 38
End: 2023-04-26
Payer: COMMERCIAL

## 2023-04-26 DIAGNOSIS — Z12.31 OTHER SCREENING MAMMOGRAM: ICD-10-CM

## 2023-04-26 DIAGNOSIS — R73.03 PREDIABETES: Primary | ICD-10-CM

## 2023-05-18 ENCOUNTER — LAB VISIT (OUTPATIENT)
Dept: LAB | Facility: HOSPITAL | Age: 38
End: 2023-05-18
Attending: INTERNAL MEDICINE
Payer: COMMERCIAL

## 2023-05-18 DIAGNOSIS — R73.03 PREDIABETES: ICD-10-CM

## 2023-05-18 LAB
ALBUMIN SERPL BCP-MCNC: 3.8 G/DL (ref 3.5–5.2)
ALP SERPL-CCNC: 72 U/L (ref 55–135)
ALT SERPL W/O P-5'-P-CCNC: 82 U/L (ref 10–44)
ANION GAP SERPL CALC-SCNC: 10 MMOL/L (ref 8–16)
AST SERPL-CCNC: 53 U/L (ref 10–40)
BASOPHILS # BLD AUTO: 0.02 K/UL (ref 0–0.2)
BASOPHILS NFR BLD: 0.5 % (ref 0–1.9)
BILIRUB SERPL-MCNC: 0.3 MG/DL (ref 0.1–1)
BUN SERPL-MCNC: 9 MG/DL (ref 6–20)
CALCIUM SERPL-MCNC: 9.3 MG/DL (ref 8.7–10.5)
CHLORIDE SERPL-SCNC: 105 MMOL/L (ref 95–110)
CHOLEST SERPL-MCNC: 171 MG/DL (ref 120–199)
CHOLEST/HDLC SERPL: 5.3 {RATIO} (ref 2–5)
CO2 SERPL-SCNC: 23 MMOL/L (ref 23–29)
CREAT SERPL-MCNC: 0.8 MG/DL (ref 0.5–1.4)
DIFFERENTIAL METHOD: ABNORMAL
EOSINOPHIL # BLD AUTO: 0.1 K/UL (ref 0–0.5)
EOSINOPHIL NFR BLD: 2.2 % (ref 0–8)
ERYTHROCYTE [DISTWIDTH] IN BLOOD BY AUTOMATED COUNT: 15.8 % (ref 11.5–14.5)
EST. GFR  (NO RACE VARIABLE): >60 ML/MIN/1.73 M^2
ESTIMATED AVG GLUCOSE: 114 MG/DL (ref 68–131)
GLUCOSE SERPL-MCNC: 77 MG/DL (ref 70–110)
HBA1C MFR BLD: 5.6 % (ref 4–5.6)
HCT VFR BLD AUTO: 37.8 % (ref 37–48.5)
HDLC SERPL-MCNC: 32 MG/DL (ref 40–75)
HDLC SERPL: 18.7 % (ref 20–50)
HGB BLD-MCNC: 11.6 G/DL (ref 12–16)
IMM GRANULOCYTES # BLD AUTO: 0 K/UL (ref 0–0.04)
IMM GRANULOCYTES NFR BLD AUTO: 0 % (ref 0–0.5)
LDLC SERPL CALC-MCNC: 118.8 MG/DL (ref 63–159)
LYMPHOCYTES # BLD AUTO: 2.5 K/UL (ref 1–4.8)
LYMPHOCYTES NFR BLD: 59.6 % (ref 18–48)
MCH RBC QN AUTO: 22.5 PG (ref 27–31)
MCHC RBC AUTO-ENTMCNC: 30.7 G/DL (ref 32–36)
MCV RBC AUTO: 73 FL (ref 82–98)
MONOCYTES # BLD AUTO: 0.5 K/UL (ref 0.3–1)
MONOCYTES NFR BLD: 12.8 % (ref 4–15)
NEUTROPHILS # BLD AUTO: 1 K/UL (ref 1.8–7.7)
NEUTROPHILS NFR BLD: 24.9 % (ref 38–73)
NONHDLC SERPL-MCNC: 139 MG/DL
NRBC BLD-RTO: 0 /100 WBC
PLATELET # BLD AUTO: 202 K/UL (ref 150–450)
PMV BLD AUTO: ABNORMAL FL (ref 9.2–12.9)
POTASSIUM SERPL-SCNC: 4.2 MMOL/L (ref 3.5–5.1)
PROT SERPL-MCNC: 8.1 G/DL (ref 6–8.4)
RBC # BLD AUTO: 5.15 M/UL (ref 4–5.4)
SODIUM SERPL-SCNC: 138 MMOL/L (ref 136–145)
TRIGL SERPL-MCNC: 101 MG/DL (ref 30–150)
WBC # BLD AUTO: 4.13 K/UL (ref 3.9–12.7)

## 2023-05-18 PROCEDURE — 80061 LIPID PANEL: CPT | Performed by: INTERNAL MEDICINE

## 2023-05-18 PROCEDURE — 83036 HEMOGLOBIN GLYCOSYLATED A1C: CPT | Performed by: INTERNAL MEDICINE

## 2023-05-18 PROCEDURE — 36415 COLL VENOUS BLD VENIPUNCTURE: CPT | Mod: PO | Performed by: INTERNAL MEDICINE

## 2023-05-18 PROCEDURE — 85025 COMPLETE CBC W/AUTO DIFF WBC: CPT | Performed by: INTERNAL MEDICINE

## 2023-05-18 PROCEDURE — 80053 COMPREHEN METABOLIC PANEL: CPT | Performed by: INTERNAL MEDICINE

## 2023-05-24 ENCOUNTER — OFFICE VISIT (OUTPATIENT)
Dept: FAMILY MEDICINE | Facility: CLINIC | Age: 38
End: 2023-05-24
Payer: COMMERCIAL

## 2023-05-24 VITALS
BODY MASS INDEX: 39.71 KG/M2 | OXYGEN SATURATION: 98 % | SYSTOLIC BLOOD PRESSURE: 112 MMHG | HEART RATE: 70 BPM | DIASTOLIC BLOOD PRESSURE: 78 MMHG | HEIGHT: 63 IN | WEIGHT: 224.13 LBS | TEMPERATURE: 98 F

## 2023-05-24 DIAGNOSIS — Z00.00 ROUTINE MEDICAL EXAM: Primary | ICD-10-CM

## 2023-05-24 DIAGNOSIS — K76.0 FATTY LIVER: ICD-10-CM

## 2023-05-24 DIAGNOSIS — E66.01 SEVERE OBESITY (BMI 35.0-35.9 WITH COMORBIDITY): ICD-10-CM

## 2023-05-24 DIAGNOSIS — D56.3 ALPHA THALASSEMIA TRAIT: ICD-10-CM

## 2023-05-24 DIAGNOSIS — R73.03 PREDIABETES: ICD-10-CM

## 2023-05-24 PROCEDURE — 3044F PR MOST RECENT HEMOGLOBIN A1C LEVEL <7.0%: ICD-10-PCS | Mod: CPTII,S$GLB,, | Performed by: INTERNAL MEDICINE

## 2023-05-24 PROCEDURE — 99999 PR PBB SHADOW E&M-EST. PATIENT-LVL III: ICD-10-PCS | Mod: PBBFAC,,, | Performed by: INTERNAL MEDICINE

## 2023-05-24 PROCEDURE — 3078F PR MOST RECENT DIASTOLIC BLOOD PRESSURE < 80 MM HG: ICD-10-PCS | Mod: CPTII,S$GLB,, | Performed by: INTERNAL MEDICINE

## 2023-05-24 PROCEDURE — 3008F PR BODY MASS INDEX (BMI) DOCUMENTED: ICD-10-PCS | Mod: CPTII,S$GLB,, | Performed by: INTERNAL MEDICINE

## 2023-05-24 PROCEDURE — 1159F MED LIST DOCD IN RCRD: CPT | Mod: CPTII,S$GLB,, | Performed by: INTERNAL MEDICINE

## 2023-05-24 PROCEDURE — 3074F SYST BP LT 130 MM HG: CPT | Mod: CPTII,S$GLB,, | Performed by: INTERNAL MEDICINE

## 2023-05-24 PROCEDURE — 1160F RVW MEDS BY RX/DR IN RCRD: CPT | Mod: CPTII,S$GLB,, | Performed by: INTERNAL MEDICINE

## 2023-05-24 PROCEDURE — 99396 PR PREVENTIVE VISIT,EST,40-64: ICD-10-PCS | Mod: S$GLB,,, | Performed by: INTERNAL MEDICINE

## 2023-05-24 PROCEDURE — 3044F HG A1C LEVEL LT 7.0%: CPT | Mod: CPTII,S$GLB,, | Performed by: INTERNAL MEDICINE

## 2023-05-24 PROCEDURE — 99396 PREV VISIT EST AGE 40-64: CPT | Mod: S$GLB,,, | Performed by: INTERNAL MEDICINE

## 2023-05-24 PROCEDURE — 1159F PR MEDICATION LIST DOCUMENTED IN MEDICAL RECORD: ICD-10-PCS | Mod: CPTII,S$GLB,, | Performed by: INTERNAL MEDICINE

## 2023-05-24 PROCEDURE — 3074F PR MOST RECENT SYSTOLIC BLOOD PRESSURE < 130 MM HG: ICD-10-PCS | Mod: CPTII,S$GLB,, | Performed by: INTERNAL MEDICINE

## 2023-05-24 PROCEDURE — 3078F DIAST BP <80 MM HG: CPT | Mod: CPTII,S$GLB,, | Performed by: INTERNAL MEDICINE

## 2023-05-24 PROCEDURE — 99999 PR PBB SHADOW E&M-EST. PATIENT-LVL III: CPT | Mod: PBBFAC,,, | Performed by: INTERNAL MEDICINE

## 2023-05-24 PROCEDURE — 1160F PR REVIEW ALL MEDS BY PRESCRIBER/CLIN PHARMACIST DOCUMENTED: ICD-10-PCS | Mod: CPTII,S$GLB,, | Performed by: INTERNAL MEDICINE

## 2023-05-24 PROCEDURE — 3008F BODY MASS INDEX DOCD: CPT | Mod: CPTII,S$GLB,, | Performed by: INTERNAL MEDICINE

## 2023-05-24 NOTE — PROGRESS NOTES
CHIEF COMPLAINT:   Chief Complaint   Patient presents with    Annual Exam        HISTORY OF PRESENT ILLNESS:  Dustin Gandhi is a 38 y.o. female who presents to the clinic today for a routine physical exam. Her last physical exam was approximately 1 years(s) ago.    Contraception: no method    Objective     PAST MEDICAL HISTORY:  Past Medical History:   Diagnosis Date    Alpha thalassemia trait     Anemia     Cholelithiasis     Elevated liver function tests 2017    Fatty liver     Menorrhagia     Obesity     Prediabetes        PAST SURGICAL HISTORY:  Past Surgical History:   Procedure Laterality Date     SECTION, LOW TRANSVERSE      x1    tonsillectomy         SOCIAL HISTORY:  Social History     Socioeconomic History    Marital status: Single    Number of children: 1   Occupational History    Occupation: lab worker   Tobacco Use    Smoking status: Never    Smokeless tobacco: Never   Substance and Sexual Activity    Alcohol use: Yes     Comment: occasionally     Drug use: No    Sexual activity: Yes     Partners: Male     Birth control/protection: Condom       FAMILY HISTORY:  Family History   Problem Relation Age of Onset    Seizures Mother         after injury    Hypertension Mother     Diabetes Mother     Heart disease Father         enlarged    No Known Problems Brother     Hypertension Maternal Grandmother     Diabetes Maternal Grandmother     Cancer Maternal Grandmother         ?gastric    Pancreatic cancer Maternal Grandfather     Lung cancer Paternal Grandmother         smoker    Heart disease Paternal Grandfather     Diabetes Maternal Aunt     Diabetes Maternal Uncle     Colon cancer Maternal Uncle     Breast cancer Neg Hx        ALLERGIES AND MEDICATIONS: updated and reviewed.  Review of patient's allergies indicates:  No Known Allergies        CARE TEAM:  Patient Care Team:  Sharron Valdes MD as PCP - General (Internal Medicine)  Ne Crane LPN as Care Coordinator         "        SCREENING HISTORY:  Health Maintenance         Date Due Completion Date    Mammogram Never done ---    COVID-19 Vaccine (3 - Booster for Moderna series) 06/10/2021 4/15/2021    TETANUS VACCINE 01/06/2022 1/6/2012    Influenza Vaccine (Season Ended) 09/01/2023 1/12/2022    Hemoglobin A1c (Prediabetes) 05/18/2024 5/18/2023    Cervical Cancer Screening 01/12/2027 1/12/2022    Override on 1/6/2012: Done    Override on 12/10/2008: Done    Pneumococcal Vaccines (Age 0-64) (3 - PPSV23 if available, else PCV20) 01/12/2027 1/12/2022              REVIEW OF SYSTEMS:   The patient reports : fair dietary habits.  The patient reports : that they are unable to exercise regularly because  of working too many hours.  Review of Systems   Constitutional:  Negative for chills, fatigue, fever and unexpected weight change.   HENT:  Negative for congestion and postnasal drip.    Eyes:  Negative for pain and visual disturbance.   Respiratory:  Negative for cough, shortness of breath and wheezing.    Cardiovascular:  Negative for chest pain, palpitations and leg swelling.   Gastrointestinal:  Negative for abdominal pain, constipation, diarrhea, nausea and vomiting.   Genitourinary:  Negative for dysuria.   Musculoskeletal:  Negative for arthralgias and back pain.   Skin:  Negative for rash.   Neurological:  Negative for weakness and headaches.   Psychiatric/Behavioral:  Negative for dysphoric mood and sleep disturbance. The patient is not nervous/anxious.     ROS (Optional)-: no pelvic pain  Breast ROS (Optional)-: negative for breast lumps/discharge      Physical Examination: 274}  Vitals:    05/24/23 0929   BP: 112/78   BP Location: Left arm   Patient Position: Sitting   BP Method: Large (Manual)   Pulse: 70   Temp: 97.5 °F (36.4 °C)   TempSrc: Oral   SpO2: 98%   Weight: 101.6 kg (224 lb 1.6 oz)   Height: 5' 3" (1.6 m)       Body mass index is 39.7 kg/m².      Patient did not require to have a chaperone present during the exam " today.    General appearance - alert, well appearing, and in no distress, obese  Psychiatric - alert, oriented to person, place, and time, normal behavior, speech, dress, motor activity and thought process  Eyes - pupils equal and reactive, extraocular eye movements intact, sclera anicteric  Mouth - not examined  Neck - supple, no significant adenopathy, carotids upstroke normal bilaterally, no bruits  Lymphatics - no palpable cervical lymphadenopathy  Chest - clear to auscultation, no wheezes, rales or rhonchi, symmetric air entry  Heart - normal rate and regular rhythm, no gallops noted  Abdomen - soft, nontender, nondistended, no masses or organomegaly  Neurological - alert, normal speech, no focal findings; cranial nerves II through XII intact  Musculoskeletal - no joint tenderness, deformity or swelling, no muscular tenderness noted  Extremities - no pedal edema noted  Skin - normal coloration, no suspicious skin lesions      Labs:  Results for orders placed or performed in visit on 05/18/23   Hemoglobin A1C   Result Value Ref Range    Hemoglobin A1C 5.6 4.0 - 5.6 %    Estimated Avg Glucose 114 68 - 131 mg/dL   LIPID PANEL   Result Value Ref Range    Cholesterol 171 120 - 199 mg/dL    Triglycerides 101 30 - 150 mg/dL    HDL 32 (L) 40 - 75 mg/dL    LDL Cholesterol 118.8 63.0 - 159.0 mg/dL    HDL/Cholesterol Ratio 18.7 (L) 20.0 - 50.0 %    Total Cholesterol/HDL Ratio 5.3 (H) 2.0 - 5.0    Non-HDL Cholesterol 139 mg/dL   Comprehensive Metabolic Panel   Result Value Ref Range    Sodium 138 136 - 145 mmol/L    Potassium 4.2 3.5 - 5.1 mmol/L    Chloride 105 95 - 110 mmol/L    CO2 23 23 - 29 mmol/L    Glucose 77 70 - 110 mg/dL    BUN 9 6 - 20 mg/dL    Creatinine 0.8 0.5 - 1.4 mg/dL    Calcium 9.3 8.7 - 10.5 mg/dL    Total Protein 8.1 6.0 - 8.4 g/dL    Albumin 3.8 3.5 - 5.2 g/dL    Total Bilirubin 0.3 0.1 - 1.0 mg/dL    Alkaline Phosphatase 72 55 - 135 U/L    AST 53 (H) 10 - 40 U/L    ALT 82 (H) 10 - 44 U/L    Anion Gap  10 8 - 16 mmol/L    eGFR >60.0 >60 mL/min/1.73 m^2   CBC W/ AUTO DIFFERENTIAL   Result Value Ref Range    WBC 4.13 3.90 - 12.70 K/uL    RBC 5.15 4.00 - 5.40 M/uL    Hemoglobin 11.6 (L) 12.0 - 16.0 g/dL    Hematocrit 37.8 37.0 - 48.5 %    MCV 73 (L) 82 - 98 fL    MCH 22.5 (L) 27.0 - 31.0 pg    MCHC 30.7 (L) 32.0 - 36.0 g/dL    RDW 15.8 (H) 11.5 - 14.5 %    Platelets 202 150 - 450 K/uL    MPV SEE COMMENT 9.2 - 12.9 fL    Immature Granulocytes 0.0 0.0 - 0.5 %    Gran # (ANC) 1.0 (L) 1.8 - 7.7 K/uL    Immature Grans (Abs) 0.00 0.00 - 0.04 K/uL    Lymph # 2.5 1.0 - 4.8 K/uL    Mono # 0.5 0.3 - 1.0 K/uL    Eos # 0.1 0.0 - 0.5 K/uL    Baso # 0.02 0.00 - 0.20 K/uL    nRBC 0 0 /100 WBC    Gran % 24.9 (L) 38.0 - 73.0 %    Lymph % 59.6 (H) 18.0 - 48.0 %    Mono % 12.8 4.0 - 15.0 %    Eosinophil % 2.2 0.0 - 8.0 %    Basophil % 0.5 0.0 - 1.9 %    Differential Method Automated           ASSESSMENT AND PLAN:  274}  1. Routine medical exam  Counseled on age appropriate medical preventative services including age appropriate cancer screenings, age appropriate eye and dental exams, over all nutritional health, need for a consistent exercise regimen, and an over all push towards maintaining a vigorous and active lifestyle.  Counseled on age appropriate vaccines and discussed upcoming health care needs based on age/gender. Discussed good sleep hygiene and stress management.    2. Prediabetes  Lab Results   Component Value Date    HGBA1C 5.6 05/18/2023     Stable. We discussed low sugar/low carbohydrate diet and regular exercise to prevent progression. No need for prescription medication at this time.  Check A1c yearly.    3. Alpha thalassemia trait  Stable. Asymptomatic. Observe.    4. Fatty liver  Lab Results   Component Value Date    ALT 82 (H) 05/18/2023    AST 53 (H) 05/18/2023    ALKPHOS 72 05/18/2023    BILITOT 0.3 05/18/2023     Asymptomatic. We discussed the need for weight loss to prevent progression to cirrhosis of the liver.  LFTs elevated. Patient is followed by hepatology. She is overdue for follow up and will schedule an appointment at her earliest convenience.  Overview:  11/2017: fibroscan suggests S3 steatosis and F3 fibrosis  12/2017 - bx - 1/4 on the scarring score    - 2017 - patient with hep B s ab present on blood work        5. Severe obesity with comorbidity  (BMI of 35.0-39.9, adult)  BMI Readings from Last 3 Encounters:   05/24/23 39.70 kg/m²   01/12/22 42.70 kg/m²   01/03/20 39.07 kg/m²     The patient is asked to make an attempt to improve diet and exercise patterns to aid in medical management of this problem.  We discussed documenting daily calorie intake and limiting daily added sugar intake (5g/serving of any product; 20 g/day total).              No orders of the defined types were placed in this encounter.     Follow up in about 1 year (around 5/24/2024), or if symptoms worsen or fail to improve, for annual exam. or sooner as needed.

## 2023-05-31 ENCOUNTER — HOSPITAL ENCOUNTER (OUTPATIENT)
Dept: RADIOLOGY | Facility: HOSPITAL | Age: 38
Discharge: HOME | End: 2023-05-31
Attending: INTERNAL MEDICINE
Payer: COMMERCIAL

## 2023-05-31 DIAGNOSIS — Z12.31 OTHER SCREENING MAMMOGRAM: ICD-10-CM

## 2023-05-31 PROCEDURE — 77067 SCR MAMMO BI INCL CAD: CPT | Mod: TC,PO

## 2023-05-31 PROCEDURE — 77067 MAMMO DIGITAL SCREENING BILAT WITH TOMO: ICD-10-PCS | Mod: 26,,, | Performed by: RADIOLOGY

## 2023-05-31 PROCEDURE — 77063 BREAST TOMOSYNTHESIS BI: CPT | Mod: 26,,, | Performed by: RADIOLOGY

## 2023-05-31 PROCEDURE — 77067 SCR MAMMO BI INCL CAD: CPT | Mod: 26,,, | Performed by: RADIOLOGY

## 2023-05-31 PROCEDURE — 77063 MAMMO DIGITAL SCREENING BILAT WITH TOMO: ICD-10-PCS | Mod: 26,,, | Performed by: RADIOLOGY

## 2024-06-05 DIAGNOSIS — R73.03 PREDIABETES: ICD-10-CM

## 2024-07-31 ENCOUNTER — HOSPITAL ENCOUNTER (OUTPATIENT)
Dept: RADIOLOGY | Facility: HOSPITAL | Age: 39
Discharge: HOME | End: 2024-07-31
Attending: INTERNAL MEDICINE
Payer: COMMERCIAL

## 2024-07-31 DIAGNOSIS — Z12.31 ENCOUNTER FOR SCREENING MAMMOGRAM FOR BREAST CANCER: ICD-10-CM

## 2024-07-31 PROCEDURE — 77067 SCR MAMMO BI INCL CAD: CPT | Mod: 26,,, | Performed by: RADIOLOGY

## 2024-07-31 PROCEDURE — 77063 BREAST TOMOSYNTHESIS BI: CPT | Mod: TC,PO

## 2024-07-31 PROCEDURE — 77063 BREAST TOMOSYNTHESIS BI: CPT | Mod: 26,,, | Performed by: RADIOLOGY

## 2024-09-15 NOTE — ADDENDUM NOTE
Addended by: ANNEMARIE CEVALLOS on: 7/13/2017 01:48 PM     Modules accepted: Orders     Universal Safety Interventions

## 2024-10-04 ENCOUNTER — TELEPHONE (OUTPATIENT)
Dept: FAMILY MEDICINE | Facility: CLINIC | Age: 39
End: 2024-10-04
Payer: COMMERCIAL

## 2024-10-04 DIAGNOSIS — R73.03 PREDIABETES: Primary | ICD-10-CM

## 2024-10-04 DIAGNOSIS — Z00.00 ROUTINE MEDICAL EXAM: ICD-10-CM

## 2024-10-04 DIAGNOSIS — K76.0 FATTY LIVER: ICD-10-CM

## 2024-10-04 NOTE — TELEPHONE ENCOUNTER
Left message on voicemail to return call to clinic. Sent message and link to schedule appointment through portal .

## 2024-10-14 ENCOUNTER — LAB VISIT (OUTPATIENT)
Dept: LAB | Facility: HOSPITAL | Age: 39
End: 2024-10-14
Attending: INTERNAL MEDICINE
Payer: COMMERCIAL

## 2024-10-14 DIAGNOSIS — R73.03 PREDIABETES: ICD-10-CM

## 2024-10-14 DIAGNOSIS — K76.0 FATTY LIVER: ICD-10-CM

## 2024-10-14 LAB
ALBUMIN SERPL BCP-MCNC: 3.3 G/DL (ref 3.5–5.2)
ALP SERPL-CCNC: 88 U/L (ref 55–135)
ALT SERPL W/O P-5'-P-CCNC: 118 U/L (ref 10–44)
ANION GAP SERPL CALC-SCNC: 10 MMOL/L (ref 8–16)
AST SERPL-CCNC: 101 U/L (ref 10–40)
BASOPHILS # BLD AUTO: 0.04 K/UL (ref 0–0.2)
BASOPHILS NFR BLD: 0.6 % (ref 0–1.9)
BILIRUB SERPL-MCNC: 0.4 MG/DL (ref 0.1–1)
BUN SERPL-MCNC: 8 MG/DL (ref 6–20)
CALCIUM SERPL-MCNC: 9 MG/DL (ref 8.7–10.5)
CHLORIDE SERPL-SCNC: 104 MMOL/L (ref 95–110)
CHOLEST SERPL-MCNC: 173 MG/DL (ref 120–199)
CHOLEST/HDLC SERPL: 4.3 {RATIO} (ref 2–5)
CO2 SERPL-SCNC: 24 MMOL/L (ref 23–29)
CREAT SERPL-MCNC: 0.8 MG/DL (ref 0.5–1.4)
DIFFERENTIAL METHOD BLD: ABNORMAL
EOSINOPHIL # BLD AUTO: 0.1 K/UL (ref 0–0.5)
EOSINOPHIL NFR BLD: 1.8 % (ref 0–8)
ERYTHROCYTE [DISTWIDTH] IN BLOOD BY AUTOMATED COUNT: 16.9 % (ref 11.5–14.5)
EST. GFR  (NO RACE VARIABLE): >60 ML/MIN/1.73 M^2
ESTIMATED AVG GLUCOSE: 126 MG/DL (ref 68–131)
GLUCOSE SERPL-MCNC: 82 MG/DL (ref 70–110)
HBA1C MFR BLD: 6 % (ref 4–5.6)
HCT VFR BLD AUTO: 37.7 % (ref 37–48.5)
HDLC SERPL-MCNC: 40 MG/DL (ref 40–75)
HDLC SERPL: 23.1 % (ref 20–50)
HGB BLD-MCNC: 11.2 G/DL (ref 12–16)
IMM GRANULOCYTES # BLD AUTO: 0.02 K/UL (ref 0–0.04)
IMM GRANULOCYTES NFR BLD AUTO: 0.3 % (ref 0–0.5)
LDLC SERPL CALC-MCNC: 117.4 MG/DL (ref 63–159)
LYMPHOCYTES # BLD AUTO: 3.1 K/UL (ref 1–4.8)
LYMPHOCYTES NFR BLD: 49.7 % (ref 18–48)
MCH RBC QN AUTO: 22.6 PG (ref 27–31)
MCHC RBC AUTO-ENTMCNC: 29.7 G/DL (ref 32–36)
MCV RBC AUTO: 76 FL (ref 82–98)
MONOCYTES # BLD AUTO: 0.6 K/UL (ref 0.3–1)
MONOCYTES NFR BLD: 9.4 % (ref 4–15)
NEUTROPHILS # BLD AUTO: 2.4 K/UL (ref 1.8–7.7)
NEUTROPHILS NFR BLD: 38.2 % (ref 38–73)
NONHDLC SERPL-MCNC: 133 MG/DL
NRBC BLD-RTO: 0 /100 WBC
PLATELET # BLD AUTO: 195 K/UL (ref 150–450)
PMV BLD AUTO: ABNORMAL FL (ref 9.2–12.9)
POTASSIUM SERPL-SCNC: 3.9 MMOL/L (ref 3.5–5.1)
PROT SERPL-MCNC: 8.2 G/DL (ref 6–8.4)
RBC # BLD AUTO: 4.96 M/UL (ref 4–5.4)
SODIUM SERPL-SCNC: 138 MMOL/L (ref 136–145)
TRIGL SERPL-MCNC: 78 MG/DL (ref 30–150)
WBC # BLD AUTO: 6.18 K/UL (ref 3.9–12.7)

## 2024-10-14 PROCEDURE — 80061 LIPID PANEL: CPT | Performed by: INTERNAL MEDICINE

## 2024-10-14 PROCEDURE — 80053 COMPREHEN METABOLIC PANEL: CPT | Performed by: INTERNAL MEDICINE

## 2024-10-14 PROCEDURE — 83036 HEMOGLOBIN GLYCOSYLATED A1C: CPT | Performed by: INTERNAL MEDICINE

## 2024-10-14 PROCEDURE — 85025 COMPLETE CBC W/AUTO DIFF WBC: CPT | Performed by: INTERNAL MEDICINE

## 2024-10-14 PROCEDURE — 36415 COLL VENOUS BLD VENIPUNCTURE: CPT | Mod: PO | Performed by: INTERNAL MEDICINE

## 2024-10-23 ENCOUNTER — LAB VISIT (OUTPATIENT)
Dept: LAB | Facility: HOSPITAL | Age: 39
End: 2024-10-23
Attending: INTERNAL MEDICINE
Payer: COMMERCIAL

## 2024-10-23 ENCOUNTER — OFFICE VISIT (OUTPATIENT)
Dept: FAMILY MEDICINE | Facility: CLINIC | Age: 39
End: 2024-10-23
Payer: COMMERCIAL

## 2024-10-23 VITALS
TEMPERATURE: 98 F | BODY MASS INDEX: 46.29 KG/M2 | DIASTOLIC BLOOD PRESSURE: 78 MMHG | OXYGEN SATURATION: 99 % | WEIGHT: 261.25 LBS | SYSTOLIC BLOOD PRESSURE: 122 MMHG | HEIGHT: 63 IN | HEART RATE: 80 BPM

## 2024-10-23 DIAGNOSIS — Z23 FLU VACCINE NEED: ICD-10-CM

## 2024-10-23 DIAGNOSIS — R73.03 PREDIABETES: ICD-10-CM

## 2024-10-23 DIAGNOSIS — Z23 NEED FOR COVID-19 VACCINE: ICD-10-CM

## 2024-10-23 DIAGNOSIS — Z23 NEED FOR PROPHYLACTIC VACCINATION WITH TETANUS-DIPHTHERIA (TD): ICD-10-CM

## 2024-10-23 DIAGNOSIS — J30.9 ALLERGIC RHINITIS, UNSPECIFIED SEASONALITY, UNSPECIFIED TRIGGER: ICD-10-CM

## 2024-10-23 DIAGNOSIS — D50.9 IRON DEFICIENCY ANEMIA, UNSPECIFIED IRON DEFICIENCY ANEMIA TYPE: ICD-10-CM

## 2024-10-23 DIAGNOSIS — H61.23 BILATERAL IMPACTED CERUMEN: ICD-10-CM

## 2024-10-23 DIAGNOSIS — E66.01 SEVERE OBESITY (BMI 35.0-35.9 WITH COMORBIDITY): ICD-10-CM

## 2024-10-23 DIAGNOSIS — Z00.00 ROUTINE MEDICAL EXAM: Primary | ICD-10-CM

## 2024-10-23 DIAGNOSIS — K76.0 FATTY LIVER: ICD-10-CM

## 2024-10-23 DIAGNOSIS — D56.3 ALPHA THALASSEMIA TRAIT: ICD-10-CM

## 2024-10-23 LAB
FERRITIN SERPL-MCNC: 126 NG/ML (ref 20–300)
IRON SERPL-MCNC: 71 UG/DL (ref 30–160)
SATURATED IRON: 17 % (ref 20–50)
TOTAL IRON BINDING CAPACITY: 410 UG/DL (ref 250–450)
TRANSFERRIN SERPL-MCNC: 277 MG/DL (ref 200–375)

## 2024-10-23 PROCEDURE — 99396 PREV VISIT EST AGE 40-64: CPT | Mod: 25,S$GLB,, | Performed by: INTERNAL MEDICINE

## 2024-10-23 PROCEDURE — 3078F DIAST BP <80 MM HG: CPT | Mod: CPTII,S$GLB,, | Performed by: INTERNAL MEDICINE

## 2024-10-23 PROCEDURE — 82728 ASSAY OF FERRITIN: CPT | Performed by: INTERNAL MEDICINE

## 2024-10-23 PROCEDURE — 84466 ASSAY OF TRANSFERRIN: CPT | Performed by: INTERNAL MEDICINE

## 2024-10-23 PROCEDURE — 90656 IIV3 VACC NO PRSV 0.5 ML IM: CPT | Mod: S$GLB,,, | Performed by: INTERNAL MEDICINE

## 2024-10-23 PROCEDURE — 36415 COLL VENOUS BLD VENIPUNCTURE: CPT | Mod: PO | Performed by: INTERNAL MEDICINE

## 2024-10-23 PROCEDURE — 90480 ADMN SARSCOV2 VAC 1/ONLY CMP: CPT | Mod: S$GLB,,, | Performed by: INTERNAL MEDICINE

## 2024-10-23 PROCEDURE — 91320 SARSCV2 VAC 30MCG TRS-SUC IM: CPT | Mod: S$GLB,,, | Performed by: INTERNAL MEDICINE

## 2024-10-23 PROCEDURE — 3074F SYST BP LT 130 MM HG: CPT | Mod: CPTII,S$GLB,, | Performed by: INTERNAL MEDICINE

## 2024-10-23 PROCEDURE — 1159F MED LIST DOCD IN RCRD: CPT | Mod: CPTII,S$GLB,, | Performed by: INTERNAL MEDICINE

## 2024-10-23 PROCEDURE — 99999 PR PBB SHADOW E&M-EST. PATIENT-LVL IV: CPT | Mod: PBBFAC,,, | Performed by: INTERNAL MEDICINE

## 2024-10-23 PROCEDURE — 1160F RVW MEDS BY RX/DR IN RCRD: CPT | Mod: CPTII,S$GLB,, | Performed by: INTERNAL MEDICINE

## 2024-10-23 PROCEDURE — 90471 IMMUNIZATION ADMIN: CPT | Mod: S$GLB,,, | Performed by: INTERNAL MEDICINE

## 2024-10-23 PROCEDURE — 3008F BODY MASS INDEX DOCD: CPT | Mod: CPTII,S$GLB,, | Performed by: INTERNAL MEDICINE

## 2024-10-23 PROCEDURE — 3044F HG A1C LEVEL LT 7.0%: CPT | Mod: CPTII,S$GLB,, | Performed by: INTERNAL MEDICINE

## 2024-10-23 NOTE — PROGRESS NOTES
Patient tolerated influenza  and covid  injection. Advised to wait 15mins. VIS information received.

## 2024-10-23 NOTE — PATIENT INSTRUCTIONS
For allergy symptoms I recommend:   antihistamine at bedtime (allegra, claritin or zyrtec - without decongestant (D))   saline nasal rinse twice daily (hold head forward and spray towards the corresponding ear; then blow your nose)  Flonase once daily after saline rinse  I also recommended allergy covers for your pillow and mattress

## 2024-10-23 NOTE — PROGRESS NOTES
CHIEF COMPLAINT:   Chief Complaint   Patient presents with    Annual Exam          HISTORY OF PRESENT ILLNESS:  Dustin Gandhi is a 39 y.o. female who presents to the clinic today for a routine physical exam. Her last physical exam was approximately 1 years(s) ago.      Patient reports a persistent dry cough that fluctuates in intensity, exacerbated by talking and persisting throughout the day once triggered. No specific diurnal pattern has been noted. She denies heartburn or reflux. Patient has sinus problems and uses OTC Aleve cold and sinus for allergies as needed.    Changes in menstrual cycle are reported, with occasional amenorrhea, representing a shift from previously regular cycles.    Patient has a history of iron deficiency and past anemia. She currently manages iron levels through diet alone due to difficulty with consistent pill intake and previous constipation with iron supplements.    A history of fatty liver disease is noted. In 2017, the patient consulted a liver specialist and underwent a biopsy, revealing stage 1 (out of 4) scarring. At that time, monitoring without specific intervention was advised. She has not seen hepatology in a few years.    Patient reports nocturnal snoring, confirmed by others. She feels well-rested upon waking and has established a regular sleep schedule, improving her overall well-being.             Contraception: condoms    Subjective     PAST MEDICAL HISTORY:  Past Medical History:   Diagnosis Date    Alpha thalassemia trait     Anemia     Cholelithiasis     Elevated liver function tests 2017    Fatty liver     Menorrhagia     Obesity     Prediabetes        PAST SURGICAL HISTORY:  Past Surgical History:   Procedure Laterality Date     SECTION, LOW TRANSVERSE      x1    tonsillectomy         SOCIAL HISTORY:  Social History     Socioeconomic History    Marital status: Single    Number of children: 1   Occupational History    Occupation: lab worker   Tobacco  Use    Smoking status: Never    Smokeless tobacco: Never   Substance and Sexual Activity    Alcohol use: Yes     Comment: occasionally     Drug use: No    Sexual activity: Yes     Partners: Male     Birth control/protection: Condom     Social Drivers of Health     Financial Resource Strain: Low Risk  (10/16/2024)    Overall Financial Resource Strain (CARDIA)     Difficulty of Paying Living Expenses: Not very hard   Food Insecurity: No Food Insecurity (10/16/2024)    Hunger Vital Sign     Worried About Running Out of Food in the Last Year: Never true     Ran Out of Food in the Last Year: Never true   Transportation Needs: No Transportation Needs (1/2/2020)    PRAPARE - Transportation     Lack of Transportation (Medical): No     Lack of Transportation (Non-Medical): No   Physical Activity: Inactive (10/16/2024)    Exercise Vital Sign     Days of Exercise per Week: 0 days     Minutes of Exercise per Session: 0 min   Stress: Stress Concern Present (10/16/2024)    [employer] - Occupational Stress Questionnaire     Feeling of Stress : To some extent   Housing Stability: Unknown (10/16/2024)    Housing Stability Vital Sign     Unable to Pay for Housing in the Last Year: No       FAMILY HISTORY:  Family History   Problem Relation Name Age of Onset    Seizures Mother          after injury    Hypertension Mother      Diabetes Mother      Heart disease Father          enlarged    No Known Problems Brother Thierno     Hypertension Maternal Grandmother      Diabetes Maternal Grandmother      Cancer Maternal Grandmother          ?gastric    Pancreatic cancer Maternal Grandfather      Lung cancer Paternal Grandmother          smoker    Heart disease Paternal Grandfather      Diabetes Maternal Aunt      Diabetes Maternal Uncle      Colon cancer Maternal Uncle      Breast cancer Neg Hx         ALLERGIES AND MEDICATIONS: updated and reviewed.  Review of patient's allergies indicates:  No Known  "Allergies        CARE TEAM:  Patient Care Team:  Sharron Valdes MD as PCP - General (Internal Medicine)  ChesterNe lópez LPN as Care Coordinator        SCREENING HISTORY:  Health Maintenance         Date Due Completion Date    TETANUS VACCINE 01/06/2022 1/6/2012    Mammogram 07/31/2025 7/31/2024    Hemoglobin A1c (Prediabetes) 10/14/2025 10/14/2024    Cervical Cancer Screening 01/12/2027 1/12/2022    Override on 1/6/2012: Done    Override on 12/10/2008: Done    Pneumococcal Vaccines (Age 0-64) (3 of 3 - PPSV23 or PCV20) 01/12/2027 1/12/2022    RSV Vaccine (Age 60+ and Pregnant patients) (1 - 1-dose 75+ series) 04/12/2058 ---                REVIEW OF SYSTEMS:   The patient reports : fair dietary habits.  The patient reports  : that they do not exercise regularly  Review of Systems   Constitutional:  Negative for activity change and unexpected weight change.   HENT:  Negative for hearing loss, rhinorrhea and trouble swallowing.    Eyes:  Negative for discharge and visual disturbance.   Respiratory:  Negative for chest tightness and wheezing.    Cardiovascular:  Negative for chest pain and palpitations.   Gastrointestinal:  Negative for blood in stool, constipation, diarrhea and vomiting.   Endocrine: Negative for polydipsia and polyuria.   Genitourinary:  Negative for difficulty urinating, dysuria, hematuria and menstrual problem.   Musculoskeletal:  Negative for arthralgias, joint swelling and neck pain.   Neurological:  Negative for weakness and headaches.   Psychiatric/Behavioral:  Negative for confusion and dysphoric mood.        ROS (Optional)-: no pelvic pain  Breast ROS (Optional)-: negative for breast lumps/discharge          Objective    PHYSICAL EXAMINATION/VITALS:   Vitals:    10/23/24 0947   BP: 122/78   Pulse: 80   Temp: 97.8 °F (36.6 °C)   TempSrc: Oral   SpO2: 99%   Weight: 118.5 kg (261 lb 3.9 oz)   Height: 5' 3" (1.6 m)       Body mass index is 46.28 kg/m².      Patient did not require to " have a chaperone present during the exam today.    General appearance - alert, well appearing, and in no distress, morbidly obese  Psychiatric - alert, oriented to person, place, and time, normal behavior, speech, dress, motor activity and thought process  Ears - bilateral cerumen impaction  Nose - some mucosal congestion and enlargement of the turbinates bilaterally  Eyes - pupils equal and reactive, extraocular eye movements intact, sclera anicteric  Neck - supple, no significant adenopathy, carotids upstroke normal bilaterally, no bruits  Lymphatics - no palpable cervical lymphadenopathy  Chest - clear to auscultation, no wheezes, rales or rhonchi, symmetric air entry  Heart - normal rate and regular rhythm  Neurological - alert, normal speech, no focal findings; cranial nerves II through XII intact  Musculoskeletal - no joint tenderness, deformity or swelling, no muscular tenderness noted  Extremities - no pedal edema noted  Skin - normal turgor, no rashes, no suspicious skin lesions noted, mild acanthosis nigricans noted in the neck folds      LABS:  Results for orders placed or performed in visit on 10/14/24   CBC Auto Differential    Collection Time: 10/14/24  2:10 PM   Result Value Ref Range    WBC 6.18 3.90 - 12.70 K/uL    RBC 4.96 4.00 - 5.40 M/uL    Hemoglobin 11.2 (L) 12.0 - 16.0 g/dL    Hematocrit 37.7 37.0 - 48.5 %    MCV 76 (L) 82 - 98 fL    MCH 22.6 (L) 27.0 - 31.0 pg    MCHC 29.7 (L) 32.0 - 36.0 g/dL    RDW 16.9 (H) 11.5 - 14.5 %    Platelets 195 150 - 450 K/uL    MPV SEE COMMENT 9.2 - 12.9 fL    Immature Granulocytes 0.3 0.0 - 0.5 %    Gran # (ANC) 2.4 1.8 - 7.7 K/uL    Immature Grans (Abs) 0.02 0.00 - 0.04 K/uL    Lymph # 3.1 1.0 - 4.8 K/uL    Mono # 0.6 0.3 - 1.0 K/uL    Eos # 0.1 0.0 - 0.5 K/uL    Baso # 0.04 0.00 - 0.20 K/uL    nRBC 0 0 /100 WBC    Gran % 38.2 38.0 - 73.0 %    Lymph % 49.7 (H) 18.0 - 48.0 %    Mono % 9.4 4.0 - 15.0 %    Eosinophil % 1.8 0.0 - 8.0 %    Basophil % 0.6 0.0 - 1.9 %     Differential Method Automated    Comprehensive Metabolic Panel    Collection Time: 10/14/24  2:10 PM   Result Value Ref Range    Sodium 138 136 - 145 mmol/L    Potassium 3.9 3.5 - 5.1 mmol/L    Chloride 104 95 - 110 mmol/L    CO2 24 23 - 29 mmol/L    Glucose 82 70 - 110 mg/dL    BUN 8 6 - 20 mg/dL    Creatinine 0.8 0.5 - 1.4 mg/dL    Calcium 9.0 8.7 - 10.5 mg/dL    Total Protein 8.2 6.0 - 8.4 g/dL    Albumin 3.3 (L) 3.5 - 5.2 g/dL    Total Bilirubin 0.4 0.1 - 1.0 mg/dL    Alkaline Phosphatase 88 55 - 135 U/L     (H) 10 - 40 U/L     (H) 10 - 44 U/L    eGFR >60.0 >60 mL/min/1.73 m^2    Anion Gap 10 8 - 16 mmol/L   Lipid Panel    Collection Time: 10/14/24  2:10 PM   Result Value Ref Range    Cholesterol 173 120 - 199 mg/dL    Triglycerides 78 30 - 150 mg/dL    HDL 40 40 - 75 mg/dL    LDL Cholesterol 117.4 63.0 - 159.0 mg/dL    HDL/Cholesterol Ratio 23.1 20.0 - 50.0 %    Total Cholesterol/HDL Ratio 4.3 2.0 - 5.0    Non-HDL Cholesterol 133 mg/dL   Hemoglobin A1C    Collection Time: 10/14/24  2:10 PM   Result Value Ref Range    Hemoglobin A1C 6.0 (H) 4.0 - 5.6 %    Estimated Avg Glucose 126 68 - 131 mg/dL              ASSESSMENT AND PLAN:   1. Routine medical exam  Counseled on age appropriate medical preventative services including age appropriate cancer screenings, age appropriate eye and dental exams, over all nutritional health, need for a consistent exercise regimen, and an over all push towards maintaining a vigorous and active lifestyle.  Counseled on age appropriate vaccines and discussed upcoming health care needs based on age/gender. Discussed good sleep hygiene and stress management.    2. Prediabetes  Lab Results   Component Value Date    HGBA1C 6.0 (H) 10/14/2024     Stable. We discussed low sugar/low carbohydrate diet and regular exercise to prevent progression. No need for prescription medication at this time.  Check A1c yearly.    3. Fatty liver  She is due for follow-up with hepatology.   We discussed that previous medications that were being studied have now been approved.  Consult order placed.  Overview:  11/2017: fibroscan suggests S3 steatosis and F3 fibrosis  12/2017 - bx - 1/4 on the scarring score    - 2017 - patient with hep B s ab present on blood work      Orders:  -     Ambulatory referral/consult to Hepatology; Future; Expected date: 10/30/2024    4. Alpha thalassemia trait  Stable. Asymptomatic. Observe.    5. Iron deficiency anemia, unspecified iron deficiency anemia type  She has had some mild iron-deficiency in the past.  She has trouble remembering to take pills, and also has some difficulty with constipation when she takes iron supplements.  I will check iron levels.  If they are low/normal she will do oral supplementation as she remembers to do so.  If her iron levels are low we will consider iron infusion.  -     Iron and TIBC; Future; Expected date: 10/23/2024  -     Ferritin; Future; Expected date: 10/23/2024    6. Severe obesity (BMI 35.0-35.9 with comorbidity)  BMI Readings from Last 3 Encounters:   10/23/24 46.28 kg/m²   05/24/23 39.70 kg/m²   01/12/22 42.70 kg/m²     The patient is asked to continue to make an attempt to improve diet and exercise patterns to aid in medical management of this problem.     7. Flu vaccine need    -     (C) influenza (Flulaval, Fluzone, Fluarix) 45 mcg/0.5 mL IM vaccine (> or = 6 mo) 0.5 mL    8. Need for prophylactic vaccination with tetanus-diphtheria (Td)  Deferred.    9. Need for COVID-19 vaccine    -     COVID-19 (Pfizer) 30 mcg/0.3 mL IM vaccine (>/= 11 yo) 0.3 mL    10. Allergic rhinitis, unspecified seasonality, unspecified trigger  We discussed several treatment strategies:   antihistamine at bedtime  saline nasal rinse twice daily  flonase once daily (best done after saline nasal rinse)  allergy covers for pillow and mattress   Patient will let me know if symptoms worsen or persist.    11. Bilateral impacted cerumen  Incidental  finding.  Patient is asymptomatic.  She does use ear buds on a regular basis.  We discussed trying some over-the-counter hydrogen peroxide to soft in the wax and see if it will come out on its own.  Consider seeing ENT for removal if she becomes symptomatic.            Assessment & Plan    PATIENT EDUCATION:  Explained the function of nasal turbinates in filtering air and their role in allergy symptoms  Discussed the potential link between overweight status and polycystic ovary syndrome  Informed about the availability of FDA-approved medication for fatty liver disease    ACTION ITEMS/LIFESTYLE:  Patient to focus on incorporating non-meat protein sources in diet (e.g., eggs, nuts, beans, Greek yogurt)  Recommend using allergy covers for pillow and mattress  Patient to implement nasal rinsing routine with saline solution          Orders Placed This Encounter   Procedures    Iron and TIBC    Ferritin    Ambulatory referral/consult to Hepatology      FOLLOW UP: Follow up in about 1 year (around 10/23/2025), or if symptoms worsen or fail to improve, for annual exam. or sooner as needed.    This note was generated with the assistance of ambient listening technology. Verbal consent was obtained by the patient and accompanying visitor(s) for the recording of patient appointment to facilitate this note. I attest to having reviewed and edited the generated note for accuracy, though some syntax or spelling errors may persist. Please contact the author of this note for any clarification.

## 2025-02-04 ENCOUNTER — OFFICE VISIT (OUTPATIENT)
Dept: HEPATOLOGY | Facility: CLINIC | Age: 40
End: 2025-02-04
Payer: COMMERCIAL

## 2025-02-04 VITALS
WEIGHT: 258.63 LBS | BODY MASS INDEX: 44.15 KG/M2 | HEIGHT: 64 IN | HEART RATE: 79 BPM | DIASTOLIC BLOOD PRESSURE: 91 MMHG | SYSTOLIC BLOOD PRESSURE: 130 MMHG

## 2025-02-04 DIAGNOSIS — E66.01 CLASS 3 SEVERE OBESITY DUE TO EXCESS CALORIES WITH BODY MASS INDEX (BMI) OF 40.0 TO 44.9 IN ADULT, UNSPECIFIED WHETHER SERIOUS COMORBIDITY PRESENT: ICD-10-CM

## 2025-02-04 DIAGNOSIS — K76.0 FATTY LIVER: Primary | ICD-10-CM

## 2025-02-04 DIAGNOSIS — R73.03 PREDIABETES: ICD-10-CM

## 2025-02-04 DIAGNOSIS — R74.8 ELEVATED LIVER ENZYMES: ICD-10-CM

## 2025-02-04 DIAGNOSIS — E66.813 CLASS 3 SEVERE OBESITY DUE TO EXCESS CALORIES WITH BODY MASS INDEX (BMI) OF 40.0 TO 44.9 IN ADULT, UNSPECIFIED WHETHER SERIOUS COMORBIDITY PRESENT: ICD-10-CM

## 2025-02-04 PROCEDURE — 99999 PR PBB SHADOW E&M-EST. PATIENT-LVL IV: CPT | Mod: PBBFAC,,,

## 2025-02-04 PROCEDURE — 1159F MED LIST DOCD IN RCRD: CPT | Mod: CPTII,S$GLB,,

## 2025-02-04 PROCEDURE — 3075F SYST BP GE 130 - 139MM HG: CPT | Mod: CPTII,S$GLB,,

## 2025-02-04 PROCEDURE — 3008F BODY MASS INDEX DOCD: CPT | Mod: CPTII,S$GLB,,

## 2025-02-04 PROCEDURE — 99204 OFFICE O/P NEW MOD 45 MIN: CPT | Mod: S$GLB,,,

## 2025-02-04 PROCEDURE — 3080F DIAST BP >= 90 MM HG: CPT | Mod: CPTII,S$GLB,,

## 2025-02-04 PROCEDURE — 1160F RVW MEDS BY RX/DR IN RCRD: CPT | Mod: CPTII,S$GLB,,

## 2025-02-04 NOTE — PATIENT INSTRUCTIONS
1. MRI elasto to look for fat or scar tissue in the liver with return to clinic   2. Follow up in 3 months with labs and MRI elasto before    FATTY LIVER:  These things are important to improve fatty liver:  Limit alcohol consumption, no more than 1 serving(s) of alcohol in any day (1 serving is 5 ounces of wine, 12 ounces of beer, or 1.5 ounces of liquor) and do NOT recommend any daily alcohol use    Weight loss goal of 35-45 lbs. ok to use weight loss medications to help with weight loss from a liver standpoint   [facility] offers benefits to patients so let me know if you want a referral to the [facility] gym. Also, [facility] has dieticians that can create a weight loss plan. If you are interested let me know and I can place a referral. If so, contact the dietician team at [facility] at email [email or web address] or call [phone].  to get scheduled. They do offer video visits   Avoid processed foods. No fried/fast foods. No sugary drinks or drinks with any calories.   Low carb/sugar and high protein diet (110 grams per day of protein).Try to limit your carb intake to LESS than  grams per day total. Use Digital Message Display Pal or Lose It wili to add up your carbs through the day. Do NOT drink any beverages with calories or carbs (this can lead to high blood sugar and weight gain). The main thing to focus on is high protein, low carb)  Exercise, 5 days per week, 30 minutes per day, as tolerated  Recommend good cholesterol, blood pressure, blood sugar levels   There is a new medication called Rezdiffra for the treatment of F2-F3 liver scarring due to fatty liver. It is only indicated for patients with significant scar tissue from fatty liver (will discuss after MRI elasto)    In some people, fatty liver can progress to steatohepatitis (inflamatory fatty liver) and possibly to cirrhosis, putting one at increased risk for liver cancer, liver failure, and death.  Therefore, the lifestyle changes are very important to decrease this risk.     Helpful website for MAS/fatty liver: [email or web address]

## 2025-02-04 NOTE — PROGRESS NOTES
[facility] - New Patient    REFERRING PROVIDER:  Dr. Sharron Valdes  PCP: Sharron Valdes MD     Chief Complaint:  fatty liver    Last seen by Dr. Boggs 1/2018     HISTORY       HPI: This is a 39 y.o. patient with PMH noted below, presenting for evaluation of  fatty liver disease     Previous serologic w/u negative for Dimas's, alpha-1 antitrypsin deficiency, hemochromatosis, autoimmune etiology, and viral hepatitis.    Prior serologic workup:   Lab Results   Component Value Date    SMOOTHMUSCAB Positive 1:40 (A) 12/01/2017    AMAIFA Negative 1:40 12/01/2017    IGGSERUM 2015 (H) 12/01/2017    ANASCREEN Positive (A) 12/01/2017    FERRITIN 126 10/23/2024    FESATURATED 17 (L) 10/23/2024    PETH Negative 12/01/2017    U8GHPZQHWO 149 12/01/2017    CERULOPLSM 33.0 12/01/2017    HEPBSAG Negative 12/01/2017    HEPCAB Negative 12/01/2017         Interval HPI: Presents today alone. States that she was told in the past about fatty liver but recently her liver enzymes have been more elevated so she wanted to check in on it. She wants to make lifestyle adjustments, but does work 2 jobs which takes up a lot of her time. She recently started walking more consistently. Trying to cut back on fried/fast food and cook at home more as well. Currently 258#.     Diet: some juice, limiting fast  Exercise: walking pad 1 hour day  Supplements: none  Occupation: cancer research    Risk factors for fatty liver include:  Obesity - current BMI 44.39  PreDM - last A1C 6.0      LABS & DIAGNOSTIC STUDIES      Labs done 10/2024 show elevated transaminase levels (ALT > AST, elevated since 2017)  Platelets wnl, alk phos wnl  Synthetic liver functioning wnl    Lab Results   Component Value Date     (H) 10/14/2024     (H) 10/14/2024    ALKPHOS 88 10/14/2024    BILITOT 0.4 10/14/2024    ALBUMIN 3.3 (L) 10/14/2024    INR 1.1 12/01/2017     10/14/2024       Imaging:  Abd U/S done 9/2018  noted  FINDINGS:  Pancreas is obscured by bowel gas.  Visualized portion of the aorta and IVC are unremarkable.  There is echogenic material layering dependently in the gallbladder which may represent small stones or sludge.  Mild gallbladder wall thickening with no significant pericholecystic fluid.  No evidence of a sonographic John sign.  Common bile duct is upper normal measuring 6 mm.  Liver is normal in size measuring 16.3 cm.  There is diffuse attenuation of sound in the liver suggesting fatty infiltration with geographic area of decreased echogenicity adjacent to the gallbladder possibly representing fatty sparing.  Right kidney measures 13.1 cm.  No right-sided hydronephrosis.     IMPRESSION:      Small stones versus sludge within the gallbladder with mild gallbladder wall thickening.  No sonographic John sign to suggest acute cholecystitis.     Diffuse fatty infiltration of the liver with possible fatty sparing adjacent to the gallbladder.      Liver fibrosis staging:  -- will obtain MRI elasto to reassess fibrosis      Liver biopsy 2017:  Liver biopsy shows fatty liver with a BARRINGTON score or 3/8 and stage 1 fibrosis.       Rare alcohol consumption, see below  Social History     Substance and Sexual Activity   Alcohol Use Yes    Comment: 1-2 times a year       Immunity to Hep A and B - will check with next labs     Denies family history of liver disease.          Allergy and medication list reviewed and updated     PMHX:  has a past medical history of Alpha thalassemia trait, Anemia, Cholelithiasis, Elevated liver function tests (2017), Fatty liver, Menorrhagia, Obesity, and Prediabetes.    PSHX:  has a past surgical history that includes  section, low transverse and tonsillectomy.    FAMILY HISTORY: Updated and reviewed in EPIC    ROS:   GENERAL: Denies fatigue  CARDIOVASCULAR: Denies edema  GI: Denies abdominal pain  SKIN: Denies rash, itching   NEURO: Denies confusion, memory loss, or  "mood changes    PHYSICAL EXAM:   In no acute distress; alert and oriented to person, place and time  VITALS: BP (!) 130/91   Pulse 79   Ht 5' 4" (1.626 m)   Wt 117.3 kg (258 lb 9.6 oz)   BMI 44.39 kg/m²   EYES: Sclerae anicteric  GI: Soft, non-tender, non-distended. No ascites.  EXTREMITIES:  No edema.  SKIN: Warm and dry. No jaundice. No telangectasias noted. No palmar erythema.  NEURO:  No asterixis.  PSYCH:  Thought and speech pattern appropriate. Behavior normal        ASSESSMENT & PLAN        EDUCATION:  See instructions discussed with patient in Instructions section of the After Visit Summary     ASSESSMENT & PLAN:  39 y.o. female with:  1.  Fatty liver, likely related to metabolic risk factors obesity, PreDM  - see HPI  -- Immunity to Hep A and B : see HPI  -- liver biopsy in 2017 noted BARRINGTON score or 3/8 and stage 1 fibrosis  -- will reassess with MRI elasto   -- Recommendations discussed with patient:  Limit alcohol consumption, no more than 1 serving(s) of alcohol in any day (1 serving is 5 ounces of wine, 12 ounces of beer, or 1.5 ounces of liquor) and do NOT recommend any daily alcohol use    2 Weight loss goal of 35-45 lbs  3. Low carb/sugar, high fiber and protein diet, limit your carb intake to LESS than 30-45 grams of carbs with a meal or LESS than 5-10 grams with any snack   4. Exercise, 5 days per week, 30 minutes per day, as tolerated  5. Recommend good cholesterol, blood pressure, blood sugar levels   6. There is a new medication called Rezdiffra for the treatment of F2-F3 liver scarring due to fatty liver. It is only indicated for those with stage 2 or 3 scar tissue (will discuss after MRI elasto)    2. Elevated liver enzymes  -- sero workup negative  -- likely from fatty liver    3. Obesity, PreDM   -- Body mass index is 44.39 kg/m².   -- increases risk for fatty liver        Follow up in about 3 months (around 5/4/2025). with labs and MRI elasto before  Orders Placed This Encounter "   Procedures    MR Elastography    Comprehensive Metabolic Panel        Thank you for allowing me to participate in the care of Dustin BustosCARMINA Rosales, FNP-C  Nurse Practitioner  [facility]  [facility]     I spent a total of 45 minutes on the day of the visit.This includes face to face time and non-face to face time preparing to see the patient (eg, review of tests), obtaining and/or reviewing separately obtained history, documenting clinical information in the electronic or other health record, independently interpreting results and communicating results to the patient/family/caregiver, and coordinating care.         CC'ed note to:   Sharron Valdes MD

## 2025-05-20 ENCOUNTER — RESULTS FOLLOW-UP (OUTPATIENT)
Dept: HEPATOLOGY | Facility: CLINIC | Age: 40
End: 2025-05-20

## 2025-05-20 ENCOUNTER — HOSPITAL ENCOUNTER (OUTPATIENT)
Dept: RADIOLOGY | Facility: HOSPITAL | Age: 40
Discharge: HOME | End: 2025-05-20
Payer: COMMERCIAL

## 2025-05-20 DIAGNOSIS — K76.0 FATTY LIVER: ICD-10-CM

## 2025-05-20 PROCEDURE — 76391 MR ELASTOGRAPHY: CPT | Mod: TC

## 2025-05-20 PROCEDURE — 76391 MR ELASTOGRAPHY: CPT | Mod: 26,,, | Performed by: RADIOLOGY

## 2025-05-27 ENCOUNTER — OFFICE VISIT (OUTPATIENT)
Dept: HEPATOLOGY | Facility: CLINIC | Age: 40
End: 2025-05-27
Payer: COMMERCIAL

## 2025-05-27 VITALS
WEIGHT: 260.56 LBS | HEART RATE: 86 BPM | SYSTOLIC BLOOD PRESSURE: 124 MMHG | HEIGHT: 64 IN | DIASTOLIC BLOOD PRESSURE: 85 MMHG | BODY MASS INDEX: 44.48 KG/M2

## 2025-05-27 DIAGNOSIS — E66.813 CLASS 3 SEVERE OBESITY DUE TO EXCESS CALORIES WITH BODY MASS INDEX (BMI) OF 40.0 TO 44.9 IN ADULT, UNSPECIFIED WHETHER SERIOUS COMORBIDITY PRESENT: ICD-10-CM

## 2025-05-27 DIAGNOSIS — R74.8 ELEVATED LIVER ENZYMES: ICD-10-CM

## 2025-05-27 DIAGNOSIS — K75.81 METABOLIC DYSFUNCTION-ASSOCIATED STEATOHEPATITIS (MASH): Primary | ICD-10-CM

## 2025-05-27 DIAGNOSIS — K74.01 HEPATIC FIBROSIS, EARLY FIBROSIS: ICD-10-CM

## 2025-05-27 DIAGNOSIS — R73.03 PREDIABETES: ICD-10-CM

## 2025-05-27 DIAGNOSIS — E66.01 CLASS 3 SEVERE OBESITY DUE TO EXCESS CALORIES WITH BODY MASS INDEX (BMI) OF 40.0 TO 44.9 IN ADULT, UNSPECIFIED WHETHER SERIOUS COMORBIDITY PRESENT: ICD-10-CM

## 2025-05-27 PROCEDURE — 99999 PR PBB SHADOW E&M-EST. PATIENT-LVL III: CPT | Mod: PBBFAC,,,

## 2025-05-27 PROCEDURE — 1159F MED LIST DOCD IN RCRD: CPT | Mod: CPTII,S$GLB,,

## 2025-05-27 PROCEDURE — 3074F SYST BP LT 130 MM HG: CPT | Mod: CPTII,S$GLB,,

## 2025-05-27 PROCEDURE — 3008F BODY MASS INDEX DOCD: CPT | Mod: CPTII,S$GLB,,

## 2025-05-27 PROCEDURE — 3079F DIAST BP 80-89 MM HG: CPT | Mod: CPTII,S$GLB,,

## 2025-05-27 PROCEDURE — 1160F RVW MEDS BY RX/DR IN RCRD: CPT | Mod: CPTII,S$GLB,,

## 2025-05-27 PROCEDURE — 99214 OFFICE O/P EST MOD 30 MIN: CPT | Mod: S$GLB,,,

## 2025-05-27 NOTE — PATIENT INSTRUCTIONS
1. Your MRI elasto to look for fat or scar tissue in the liver showed ~34% fat in the liver, stage 1-2 scar tissue damage   2. Recommend vaccines for Hepatitis  A and B if you have not completed them in the past, see below   3. Referral sent to bariatrics for medication  4. Follow up in 1 year with labs and MRI before    FATTY LIVER  These things are important to improve fatty liver:  No alcohol consumption with fibrosis  2 Weight loss goal of 30-40 lbs. ok to use weight loss medications to help with weight loss from a liver standpoint if you don't have any other contraindications   3. [facility] offers benefits to patients so let me know if you want a referral to the [facility] gym. Also, [facility] has dieticians that can create a weight loss plan. If you are interested let me know and I can place a referral. If so, contact the dietician team at [facility] at email [email or web address] or call [phone].  to get scheduled. They do offer video visits   4. Avoid processed foods. No fried/fast foods. No sugary drinks or drinks with any calories.   5. Low carb/sugar and high protein diet (100 grams per day of protein).Try to limit your carb intake to LESS than  grams per day total. Use Biogazelle Pal or Lose It wili to add up your carbs through the day. Do NOT drink any beverages with calories or carbs (this can lead to high blood sugar and weight gain). The main thing to focus on is high protein, low carb)  6. Exercise, 5 days per week, 30 minutes per day, as tolerated  7. Recommend good cholesterol, blood pressure, blood sugar levels   8. There is a new medication called Rezdiffra for the treatment of F2-F3 liver scarring due to fatty liver. It is only indicated for patients with significant scar tissue from fatty liver (would recommend)    In some people, fatty liver can progress to steatohepatitis (inflamatory fatty liver) and possibly to cirrhosis,  increasing the risk for liver cancer, liver failure, and death. Therefore, the lifestyle changes are very important to decrease this risk.     Helpful website for [facility]/fatty liver: [email or web address]    REZDIFFRA:  There is now an FDA approved treatment for fatty liver called Rezdiffra. It is only indicated in the treatment of fatty liver in those who have stage 2 or stage 3 scar tissue damage  REZDIFFRA is a thyroid hormone receptor-beta (THR-beta) agonist indicated in conjunction with diet and exercise for the treatment of adults with noncirrhotic nonalcoholic steatohepatitis (BROWNE) with moderate to advanced liver fibrosis (consistent with stages F2 to F3 fibrosis).      Your fibroscan showed stage 2 scar tissue, therefore you are a candidate for this medication     The dose is weight based and is as follows  <100 kg weight = 80 mg daily   >100 kg weight = 100 mg daily   The medication can be taken with or without food    Medications that you cannot take with Rezdiffra: Gemfibrozil (ok to take Fenofibrate, just not Gemfibrozil)    Medications that need dose adjustments:  -- Plavix (can only use 60-80 mg of Rezdiffra)  -- statins    Max dose of Pravastatin/Atorvastatin = 40 mg daily    Max dose of Simvastatin/Rosuvastatin = 20 mg daily     Warnings  -- it can cause worsening gallstones, gallbladder inflammation but occurred in <1% of patients in the study    Side effects  -- nausea and diarrhea were most common. It occurred most in the first month, then decreased over that time back to the patient's baseline/normal  -- itching  -- vomiting  -- abdominal pain   -- constipation     Recommended lab monitoring after starting    -- labs 3 and 6 months after starting   -- if labs stable then, can do yearly     Decreased kidney function restrictions  Avoid if GFR <30      HEP A/B VACCINE  The CDC recommends that all adults complete the combination Hepatitis A and B vaccine called TwinRix. This will  protect your liver from these viruses, which can make your liver very sick.     Hep A can be transmitted through food and water and can cause significant liver injury. There was previously a significant Hepatitis A outbreak in [place]. Hep B vaccine is transmitted through blood or bodily fluids (there are no symptoms typically) and can develop longstanding (chronic) Hep B and there is no cure, so many people have it for life. Therefore, the vaccines provide immunity against these viruses that can cause harm to the liver.     The vaccine series is 3 vaccines: one now, one at 4 weeks and one 6 months after the 1st one. You can get it from any pharmacy but any [facility] pharmacy typically stocks it and administers it frequently      If the vaccine is not covered at the pharmacy level with your insurance, you may be able to get it with your PCP or in the infectious disease department at [facility] or from your [facility].

## 2025-05-27 NOTE — PROGRESS NOTES
[facility] - [facility]    PCP: Sharron Valdes MD     Chief Complaint:  fatty liver    HISTORY       HPI: This is a 40 y.o. patient with PMH noted below, presenting for follow up of  fatty liver disease     Previous serologic w/u negative for Dimas's, alpha-1 antitrypsin deficiency, hemochromatosis, autoimmune etiology, and viral hepatitis.    Prior serologic workup:   Lab Results   Component Value Date    SMOOTHMUSCAB Positive 1:40 (A) 12/01/2017    AMAIFA Negative 1:40 12/01/2017    IGGSERUM 2015 (H) 12/01/2017    ANASCREEN Positive (A) 12/01/2017    FERRITIN 126 10/23/2024    FESATURATED 17 (L) 10/23/2024    PETH Negative 12/01/2017    H7EWKTRTBZ 149 12/01/2017    CERULOPLSM 33.0 12/01/2017    HEPBSAG Negative 12/01/2017    HEPCAB Negative 12/01/2017         Interval HPI: Presents today alone. No new complaints from liver standpoint. Currently 260#. States that her father recently passed unexpectedly and so it has been hard for her to work on lifestyle adjustments. Really does want to try to start new healthy lifestyle adjustments. Is considering trying GLP-1. Would not be opposed to trying rezdiffra but wants to try the injections first.    Diet: some juice, limiting fast food  Exercise: walking pad 1 hour day  Supplements: none  Occupation: cancer research    Risk factors for fatty liver include:  Obesity - current BMI 44.73  PreDM - last A1C 6.0      LABS & DIAGNOSTIC STUDIES      Labs done 5/2025 show elevated transaminase levels (ALT > AST, elevated since 2017)  Platelets wnl, alk phos wnl  Synthetic liver functioning wnl    Lab Results   Component Value Date     (H) 05/20/2025     (H) 05/20/2025    ALKPHOS 100 05/20/2025    BILITOT 0.4 05/20/2025    ALBUMIN 3.0 (L) 05/20/2025    INR 1.1 12/01/2017     10/14/2024       Imaging:  Abd U/S done 9/2018 noted  FINDINGS:  Pancreas is obscured by bowel gas.  Visualized portion of the aorta and IVC are unremarkable.  There  is echogenic material layering dependently in the gallbladder which may represent small stones or sludge.  Mild gallbladder wall thickening with no significant pericholecystic fluid.  No evidence of a sonographic Ojhn sign.  Common bile duct is upper normal measuring 6 mm.  Liver is normal in size measuring 16.3 cm.  There is diffuse attenuation of sound in the liver suggesting fatty infiltration with geographic area of decreased echogenicity adjacent to the gallbladder possibly representing fatty sparing.  Right kidney measures 13.1 cm.  No right-sided hydronephrosis.     IMPRESSION:      Small stones versus sludge within the gallbladder with mild gallbladder wall thickening.  No sonographic John sign to suggest acute cholecystitis.     Diffuse fatty infiltration of the liver with possible fatty sparing adjacent to the gallbladder.      Liver fibrosis staging:  MRI elasto 2025 noted mild/moderate steatosis, F1-2, no iron      Liver biopsy 2017:  Liver biopsy shows fatty liver with a BARRINGTON score or 3/8 and stage 1 fibrosis.       Rare alcohol consumption, see below  Social History     Substance and Sexual Activity   Alcohol Use Yes    Comment: 1-2 times a year       Immunity to Hep A and B - recommend vaccinations if not immune     Denies family history of liver disease.          Allergy and medication list reviewed and updated     PMHX:  has a past medical history of Alpha thalassemia trait, Anemia, Cholelithiasis, Elevated liver function tests (2017), Fatty liver, Menorrhagia, Obesity, and Prediabetes.    PSHX:  has a past surgical history that includes  section, low transverse and tonsillectomy.    FAMILY HISTORY: Updated and reviewed in EPIC    ROS:   GENERAL: Denies fatigue  CARDIOVASCULAR: Denies edema  GI: Denies abdominal pain  SKIN: Denies rash, itching   NEURO: Denies confusion, memory loss, or mood changes    PHYSICAL EXAM:   In no acute distress; alert and oriented to person, place  "and time  VITALS: /85   Pulse 86   Ht 5' 4" (1.626 m)   Wt 118.2 kg (260 lb 9.3 oz)   BMI 44.73 kg/m²   EYES: Sclerae anicteric  GI: Soft, non-tender, non-distended. No ascites.  EXTREMITIES:  No edema.  SKIN: Warm and dry. No jaundice. No telangectasias noted. No palmar erythema.  NEURO:  No asterixis.  PSYCH:  Thought and speech pattern appropriate. Behavior normal        ASSESSMENT & PLAN        EDUCATION:  See instructions discussed with patient in Instructions section of the After Visit Summary     ASSESSMENT & PLAN:  40 y.o. female with:  1.  Fatty liver, likely related to metabolic risk factors obesity, PreDM  - see HPI  -- Immunity to Hep A and B : see HPI  -- MRI elasto 5/2025 notes mild/moderate steatosis, F1-2, no iron  -- will repeat MRI in 1 year to monitor  -- pt would like to try GLP-1 prior to rezdiffra - referral sent to bariatrics   -- Recommendations discussed with patient:  Limit alcohol consumption, no more than 1 serving(s) of alcohol in any day (1 serving is 5 ounces of wine, 12 ounces of beer, or 1.5 ounces of liquor) and do NOT recommend any daily alcohol use    2 Weight loss goal of 35-45 lbs  3. Low carb/sugar, high fiber and protein diet, limit your carb intake to LESS than 30-45 grams of carbs with a meal or LESS than 5-10 grams with any snack   4. Exercise, 5 days per week, 30 minutes per day, as tolerated  5. Recommend good cholesterol, blood pressure, blood sugar levels   6. There is a new medication called Rezdiffra for the treatment of F2-F3 liver scarring due to fatty liver. It is only indicated for those with stage 2 or 3 scar tissue (will discuss after MRI elasto)    2. Hepatic fibrosis  -- MRI elasto notes stage 1-2 fibrosis  -- HCC surv with AFP and imaging annually - AFP due now (will get with next labs)  -- will repeat MRI elasto in 1 year to monitor  -- recommend significant lifestyle adjustments/weight loss or starting rezdiffra now  -- pt would like to try to " lose weight and will touch base in 3 months if she wants to start rezdiffra    3. Elevated liver enzymes  -- sero workup negative  -- likely from fatty liver    4. Obesity, PreDM   -- Body mass index is 44.73 kg/m².   -- increases risk for fatty liver        Follow up in about 1 year (around 5/27/2026). with labs and MRI elasto before  Orders Placed This Encounter   Procedures    MR Elastography    Hepatic Function Panel    AFP Tumor Marker    Ambulatory referral/consult to Bariatric/Obesity Medicine        Thank you for allowing me to participate in the care of Dustin Gandhi    CARMINA Ba, FNP-C  Nurse Practitioner  [facility]  [facility]     I spent a total of 30 minutes on the day of the visit.This includes face to face time and non-face to face time preparing to see the patient (eg, review of tests), obtaining and/or reviewing separately obtained history, documenting clinical information in the electronic or other health record, independently interpreting results and communicating results to the patient/family/caregiver, and coordinating care.